# Patient Record
Sex: MALE | Race: WHITE | NOT HISPANIC OR LATINO
[De-identification: names, ages, dates, MRNs, and addresses within clinical notes are randomized per-mention and may not be internally consistent; named-entity substitution may affect disease eponyms.]

---

## 2017-06-15 ENCOUNTER — RECORD ABSTRACTING (OUTPATIENT)
Age: 62
End: 2017-06-15

## 2017-06-15 DIAGNOSIS — R07.89 OTHER CHEST PAIN: ICD-10-CM

## 2017-06-15 DIAGNOSIS — R68.84 JAW PAIN: ICD-10-CM

## 2017-06-15 DIAGNOSIS — K22.9 DISEASE OF ESOPHAGUS, UNSPECIFIED: ICD-10-CM

## 2017-06-23 ENCOUNTER — LABORATORY RESULT (OUTPATIENT)
Age: 62
End: 2017-06-23

## 2017-06-23 ENCOUNTER — NON-APPOINTMENT (OUTPATIENT)
Age: 62
End: 2017-06-23

## 2017-06-23 ENCOUNTER — APPOINTMENT (OUTPATIENT)
Dept: INTERNAL MEDICINE | Facility: CLINIC | Age: 62
End: 2017-06-23

## 2017-06-23 VITALS
HEIGHT: 69 IN | WEIGHT: 178 LBS | DIASTOLIC BLOOD PRESSURE: 70 MMHG | BODY MASS INDEX: 26.36 KG/M2 | SYSTOLIC BLOOD PRESSURE: 102 MMHG

## 2017-06-23 VITALS — DIASTOLIC BLOOD PRESSURE: 70 MMHG | SYSTOLIC BLOOD PRESSURE: 102 MMHG

## 2017-06-23 LAB
BILIRUB UR QL STRIP: NORMAL
GLUCOSE UR-MCNC: NORMAL
HCG UR QL: 1 EU/DL
HGB UR QL STRIP.AUTO: NORMAL
KETONES UR-MCNC: NORMAL
LEUKOCYTE ESTERASE UR QL STRIP: NORMAL
NITRITE UR QL STRIP: NORMAL
PH UR STRIP: 6.5
PROT UR STRIP-MCNC: NORMAL
SP GR UR STRIP: 1.02

## 2017-06-24 LAB
25(OH)D3 SERPL-MCNC: 26.3 NG/ML
ALBUMIN SERPL ELPH-MCNC: 4 G/DL
ALP BLD-CCNC: 57 U/L
ALT SERPL-CCNC: 15 U/L
ANION GAP SERPL CALC-SCNC: 13 MMOL/L
AST SERPL-CCNC: 26 U/L
BASOPHILS # BLD AUTO: 0.02 K/UL
BASOPHILS NFR BLD AUTO: 0.2 %
BILIRUB SERPL-MCNC: 0.6 MG/DL
BUN SERPL-MCNC: 19 MG/DL
CALCIUM SERPL-MCNC: 9.3 MG/DL
CHLORIDE SERPL-SCNC: 106 MMOL/L
CHOLEST SERPL-MCNC: 167 MG/DL
CHOLEST/HDLC SERPL: 2.7 RATIO
CO2 SERPL-SCNC: 25 MMOL/L
CREAT SERPL-MCNC: 1.12 MG/DL
EOSINOPHIL # BLD AUTO: 0.22 K/UL
EOSINOPHIL NFR BLD AUTO: 2.6 %
GLUCOSE SERPL-MCNC: 81 MG/DL
HBA1C MFR BLD HPLC: 5.8 %
HCT VFR BLD CALC: 40.1 %
HDLC SERPL-MCNC: 63 MG/DL
HGB BLD-MCNC: 13.6 G/DL
IMM GRANULOCYTES NFR BLD AUTO: 0.2 %
LDLC SERPL CALC-MCNC: 76 MG/DL
LYMPHOCYTES # BLD AUTO: 1.43 K/UL
LYMPHOCYTES NFR BLD AUTO: 16.7 %
MAN DIFF?: NORMAL
MCHC RBC-ENTMCNC: 30.3 PG
MCHC RBC-ENTMCNC: 33.9 GM/DL
MCV RBC AUTO: 89.3 FL
MONOCYTES # BLD AUTO: 0.8 K/UL
MONOCYTES NFR BLD AUTO: 9.4 %
NEUTROPHILS # BLD AUTO: 6.06 K/UL
NEUTROPHILS NFR BLD AUTO: 70.9 %
PLATELET # BLD AUTO: 222 K/UL
POTASSIUM SERPL-SCNC: 4.6 MMOL/L
PROT SERPL-MCNC: 6.8 G/DL
PSA SERPL-MCNC: 1.3 NG/ML
PSA SERPL-MCNC: 1.38 NG/ML
RBC # BLD: 4.49 M/UL
RBC # FLD: 13.1 %
SAVE SPECIMEN: NORMAL
SODIUM SERPL-SCNC: 144 MMOL/L
T3RU NFR SERPL: 1.03 INDEX
T4 SERPL-MCNC: 6.4 UG/DL
TRIGL SERPL-MCNC: 139 MG/DL
TSH SERPL-ACNC: 1.91 UIU/ML
URATE SERPL-MCNC: 6 MG/DL
WBC # FLD AUTO: 8.55 K/UL

## 2017-10-06 ENCOUNTER — APPOINTMENT (OUTPATIENT)
Dept: OPHTHALMOLOGY | Facility: CLINIC | Age: 62
End: 2017-10-06
Payer: COMMERCIAL

## 2017-10-06 DIAGNOSIS — H25.13 AGE-RELATED NUCLEAR CATARACT, BILATERAL: ICD-10-CM

## 2017-10-06 DIAGNOSIS — H40.003 PREGLAUCOMA, UNSPECIFIED, BILATERAL: ICD-10-CM

## 2017-10-06 PROCEDURE — 92133 CPTRZD OPH DX IMG PST SGM ON: CPT

## 2017-10-06 PROCEDURE — 92015 DETERMINE REFRACTIVE STATE: CPT

## 2017-10-06 PROCEDURE — 76514 ECHO EXAM OF EYE THICKNESS: CPT

## 2017-10-06 PROCEDURE — 99244 OFF/OP CNSLTJ NEW/EST MOD 40: CPT

## 2018-01-12 ENCOUNTER — APPOINTMENT (OUTPATIENT)
Dept: OPHTHALMOLOGY | Facility: CLINIC | Age: 63
End: 2018-01-12

## 2018-11-12 ENCOUNTER — APPOINTMENT (OUTPATIENT)
Dept: INTERNAL MEDICINE | Facility: CLINIC | Age: 63
End: 2018-11-12

## 2018-11-21 ENCOUNTER — LABORATORY RESULT (OUTPATIENT)
Age: 63
End: 2018-11-21

## 2018-11-21 ENCOUNTER — APPOINTMENT (OUTPATIENT)
Dept: INTERNAL MEDICINE | Facility: CLINIC | Age: 63
End: 2018-11-21
Payer: COMMERCIAL

## 2018-11-21 ENCOUNTER — NON-APPOINTMENT (OUTPATIENT)
Age: 63
End: 2018-11-21

## 2018-11-21 VITALS — SYSTOLIC BLOOD PRESSURE: 98 MMHG | DIASTOLIC BLOOD PRESSURE: 70 MMHG

## 2018-11-21 VITALS
WEIGHT: 170 LBS | SYSTOLIC BLOOD PRESSURE: 110 MMHG | HEIGHT: 68.5 IN | BODY MASS INDEX: 25.47 KG/M2 | DIASTOLIC BLOOD PRESSURE: 72 MMHG

## 2018-11-21 DIAGNOSIS — K44.9 DIAPHRAGMATIC HERNIA W/OUT OBSTRUCTION OR GANGRENE: ICD-10-CM

## 2018-11-21 LAB
BILIRUB UR QL STRIP: NORMAL
CLARITY UR: CLEAR
COLLECTION METHOD: NORMAL
GLUCOSE UR-MCNC: NORMAL
HCG UR QL: 0.2 EU/DL
HGB UR QL STRIP.AUTO: NORMAL
KETONES UR-MCNC: NORMAL
LEUKOCYTE ESTERASE UR QL STRIP: NORMAL
NITRITE UR QL STRIP: NORMAL
PH UR STRIP: 5
PROT UR STRIP-MCNC: NORMAL
SP GR UR STRIP: 1.02

## 2018-11-21 PROCEDURE — G0008: CPT

## 2018-11-21 PROCEDURE — 81003 URINALYSIS AUTO W/O SCOPE: CPT | Mod: QW

## 2018-11-21 PROCEDURE — 93000 ELECTROCARDIOGRAM COMPLETE: CPT

## 2018-11-21 PROCEDURE — 36415 COLL VENOUS BLD VENIPUNCTURE: CPT

## 2018-11-21 PROCEDURE — 99397 PER PM REEVAL EST PAT 65+ YR: CPT | Mod: 25

## 2018-11-21 PROCEDURE — 90686 IIV4 VACC NO PRSV 0.5 ML IM: CPT

## 2018-11-21 NOTE — HISTORY OF PRESENT ILLNESS
[FreeTextEntry1] : This is a 63-year-old male for annual health assessment [de-identified] : Specifically we will address his history of hypercholesterolemia sinus bradycardia reflux kind atypical chest pain\par \par Patient has few complaints nocturia x1 and a decreased stream.\par \par His major problem is stress both at home and work and a possible impending divorce. He states he is irritable and has difficulty sleeping

## 2018-11-21 NOTE — ASSESSMENT
[FreeTextEntry1] : This is a 63-year-old male whose history has been reviewed above\par \par He has a history of hypercholesterolemia remains on a statin for cholesterol profile a prescription pain medication changes per dictated results.\par \par He is having some decreased stream and nocturia x1. I don't feel that he needs any intervention at this time\par \par He is quite anxious appropriately. I do not think he needs any chronic psychotropic medications however if he disorders I will prescribe her anxietolitic and hypnotic medications for p.r.n. use\par \par He did have a stricture on endoscopy which looked benign however I suggested a followup with sugar in which he did not do he states she will followup this year\par \par She will also followup in reference to his tubular adenoma with GI

## 2018-11-21 NOTE — HEALTH RISK ASSESSMENT
[Very Good] : ~his/her~  mood as very good [None] : None [With Family] : lives with family [Employed] : employed [College] : College [] :  [Feels Safe at Home] : Feels safe at home [Fully functional (bathing, dressing, toileting, transferring, walking, feeding)] : Fully functional (bathing, dressing, toileting, transferring, walking, feeding) [Fully functional (using the telephone, shopping, preparing meals, housekeeping, doing laundry, using] : Fully functional and needs no help or supervision to perform IADLs (using the telephone, shopping, preparing meals, housekeeping, doing laundry, using transportation, managing medications and managing finances) [Smoke Detector] : smoke detector [Carbon Monoxide Detector] : carbon monoxide detector [Seat Belt] :  uses seat belt [Sunscreen] : uses sunscreen [Discussed at today's visit] : Advance Directives Discussed at today's visit [] : No [Change in mental status noted] : No change in mental status noted [Sexually Active] : not sexually active [Reports changes in hearing] : Reports no changes in hearing [Reports changes in vision] : Reports no changes in vision [Reports changes in dental health] : Reports no changes in dental health [Guns at Home] : no guns at home [Safety elements used in home] : no safety elements used in home [Travel to Developing Areas] : does not  travel to developing areas [TB Exposure] : is not being exposed to tuberculosis [Caregiver Concerns] : does not have caregiver concerns [HepatitisCComments] : negative

## 2018-11-22 LAB
25(OH)D3 SERPL-MCNC: 32.3 NG/ML
ALBUMIN SERPL ELPH-MCNC: 4.3 G/DL
ALP BLD-CCNC: 53 U/L
ALT SERPL-CCNC: 14 U/L
ANION GAP SERPL CALC-SCNC: 13 MMOL/L
AST SERPL-CCNC: 24 U/L
BASOPHILS # BLD AUTO: 0.02 K/UL
BASOPHILS NFR BLD AUTO: 0.3 %
BILIRUB SERPL-MCNC: 0.6 MG/DL
BUN SERPL-MCNC: 27 MG/DL
CALCIUM SERPL-MCNC: 9.9 MG/DL
CHLORIDE SERPL-SCNC: 106 MMOL/L
CHOLEST SERPL-MCNC: 202 MG/DL
CHOLEST/HDLC SERPL: 2.5 RATIO
CO2 SERPL-SCNC: 26 MMOL/L
CREAT SERPL-MCNC: 1.3 MG/DL
EOSINOPHIL # BLD AUTO: 0.3 K/UL
EOSINOPHIL NFR BLD AUTO: 4 %
GLUCOSE SERPL-MCNC: 88 MG/DL
HBA1C MFR BLD HPLC: 5.9 %
HCT VFR BLD CALC: 41.8 %
HDLC SERPL-MCNC: 80 MG/DL
HGB BLD-MCNC: 13.9 G/DL
IMM GRANULOCYTES NFR BLD AUTO: 0.1 %
LDLC SERPL CALC-MCNC: 105 MG/DL
LYMPHOCYTES # BLD AUTO: 1.36 K/UL
LYMPHOCYTES NFR BLD AUTO: 18.2 %
MAN DIFF?: NORMAL
MCHC RBC-ENTMCNC: 29.9 PG
MCHC RBC-ENTMCNC: 33.3 GM/DL
MCV RBC AUTO: 89.9 FL
MONOCYTES # BLD AUTO: 0.49 K/UL
MONOCYTES NFR BLD AUTO: 6.6 %
NEUTROPHILS # BLD AUTO: 5.29 K/UL
NEUTROPHILS NFR BLD AUTO: 70.8 %
PLATELET # BLD AUTO: 184 K/UL
POTASSIUM SERPL-SCNC: 4.6 MMOL/L
PROT SERPL-MCNC: 6.6 G/DL
PSA SERPL-MCNC: 1.16 NG/ML
RBC # BLD: 4.65 M/UL
RBC # FLD: 13.6 %
SAVE SPECIMEN: NORMAL
SODIUM SERPL-SCNC: 145 MMOL/L
T3RU NFR SERPL: 1.03 INDEX
T4 SERPL-MCNC: 5.8 UG/DL
TRIGL SERPL-MCNC: 85 MG/DL
TSH SERPL-ACNC: 2.62 UIU/ML
URATE SERPL-MCNC: 5.3 MG/DL
WBC # FLD AUTO: 7.47 K/UL

## 2018-12-07 ENCOUNTER — APPOINTMENT (OUTPATIENT)
Dept: INTERNAL MEDICINE | Facility: CLINIC | Age: 63
End: 2018-12-07

## 2019-02-05 ENCOUNTER — APPOINTMENT (OUTPATIENT)
Dept: GASTROENTEROLOGY | Facility: CLINIC | Age: 64
End: 2019-02-05
Payer: MEDICARE

## 2019-02-05 VITALS
HEIGHT: 68.5 IN | SYSTOLIC BLOOD PRESSURE: 115 MMHG | HEART RATE: 47 BPM | DIASTOLIC BLOOD PRESSURE: 64 MMHG | BODY MASS INDEX: 25.77 KG/M2 | WEIGHT: 172 LBS

## 2019-02-05 DIAGNOSIS — Z82.49 FAMILY HISTORY OF ISCHEMIC HEART DISEASE AND OTHER DISEASES OF THE CIRCULATORY SYSTEM: ICD-10-CM

## 2019-02-05 DIAGNOSIS — R19.5 OTHER FECAL ABNORMALITIES: ICD-10-CM

## 2019-02-05 DIAGNOSIS — K63.5 POLYP OF COLON: ICD-10-CM

## 2019-02-05 DIAGNOSIS — K62.5 HEMORRHAGE OF ANUS AND RECTUM: ICD-10-CM

## 2019-02-05 PROCEDURE — 99204 OFFICE O/P NEW MOD 45 MIN: CPT

## 2019-02-05 PROCEDURE — 82274 ASSAY TEST FOR BLOOD FECAL: CPT | Mod: QW

## 2019-02-07 NOTE — ASSESSMENT
[FreeTextEntry1] : 1. History of colonic tubular adenoma, with hyperplastic polyp and hemorrhoids at last recorded colonoscopy January 2012--rule out metachronous colorectal neoplasm. Episodic rectal bleeding and today's guaiac positive stool likely hemorrhoidal in origin.\par 2. History of esophageal stricture, GERD, with no significant upper GI symptoms at this point.\par 3. Overweight.\par 4. Hypercholesterolemia.\par 5. Prediabetes.\par 6. Mild BPH.\par 7. Sinus bradycardia.\par 8. Ex-smoker.\par \par Plan:\par 1. Medical record release for Dr. Keith Liang.\par 2. Recent labs reviewed.\par 3. Agree with periodic surveillance colonoscopy-- Procedure, rationale, alternatives, material risks, anesthesia plan, MiraLax prep instructions were reviewed and brochure given.\par 4. Other recommendations to follow.

## 2019-02-07 NOTE — PHYSICAL EXAM
[General Appearance - Alert] : alert [General Appearance - In No Acute Distress] : in no acute distress [General Appearance - Well Nourished] : well nourished [General Appearance - Well Developed] : well developed [Sclera] : the sclera and conjunctiva were normal [Outer Ear] : the ears and nose were normal in appearance [Oropharynx] : the oropharynx was normal [Neck Appearance] : the appearance of the neck was normal [Neck Cervical Mass (___cm)] : no neck mass was observed [Jugular Venous Distention Increased] : there was no jugular-venous distention [Thyroid Diffuse Enlargement] : the thyroid was not enlarged [Thyroid Nodule] : there were no palpable thyroid nodules [Auscultation Breath Sounds / Voice Sounds] : lungs were clear to auscultation bilaterally [Heart Sounds] : normal S1 and S2 [Heart Sounds Gallop] : no gallops [Murmurs] : no murmurs [Heart Sounds Pericardial Friction Rub] : no pericardial rub [Full Pulse] : the pedal pulses are present [Edema] : there was no peripheral edema [Bowel Sounds] : normal bowel sounds [Abdomen Soft] : soft [Abdomen Tenderness] : non-tender [Abdomen Mass (___ Cm)] : no abdominal mass palpated [Normal Sphincter Tone] : normal sphincter tone [No Rectal Mass] : no rectal mass [Internal Hemorrhoid] : internal hemorrhoids [External Hemorrhoid] : external hemorrhoids [Occult Blood Positive] : stool positive for occult blood [Prostate Size___ (Scale 0-4)] : prostate size was [unfilled] on a scale of 0-4 [Cervical Lymph Nodes Enlarged Posterior Bilaterally] : posterior cervical [Cervical Lymph Nodes Enlarged Anterior Bilaterally] : anterior cervical [Supraclavicular Lymph Nodes Enlarged Bilaterally] : supraclavicular [Axillary Lymph Nodes Enlarged Bilaterally] : axillary [Femoral Lymph Nodes Enlarged Bilaterally] : femoral [Inguinal Lymph Nodes Enlarged Bilaterally] : inguinal [No CVA Tenderness] : no ~M costovertebral angle tenderness [No Spinal Tenderness] : no spinal tenderness [Abnormal Walk] : normal gait [Nail Clubbing] : no clubbing  or cyanosis of the fingernails [Musculoskeletal - Swelling] : no joint swelling seen [Motor Tone] : muscle strength and tone were normal [Skin Color & Pigmentation] : normal skin color and pigmentation [Skin Turgor] : normal skin turgor [] : no rash [Oriented To Time, Place, And Person] : oriented to person, place, and time [Impaired Insight] : insight and judgment were intact [Affect] : the affect was normal [Prostate Tenderness] : was not tender [FreeTextEntry1] : some cherry angiomata

## 2019-02-07 NOTE — HISTORY OF PRESENT ILLNESS
[FreeTextEntry1] : Geremias has undergone several colonoscopies (Dr. Keith Liang), with tubular adenoma noted at first procedure; according to records forwarded to me yesterday (that were difficult to read), a hyperplastic polyp was removed at last colonoscopy 1/8/12, although patient seems to recall another procedure in 2015. He has history of GERD and atypical chest pain, was advised of esophageal stricture at EGD, but never underwent esophageal dilatation, and he currently denies upper GI symptoms, not taking PPI at this time. He has noted some bright red blood upon wiping from time to time, attributed to hemorrhoids. Family history is negative for GI neoplasia.

## 2019-02-07 NOTE — CONSULT LETTER
[Dear  ___] : Dear  [unfilled], [Consult Letter:] : I had the pleasure of evaluating your patient, [unfilled]. [Please see my note below.] : Please see my note below. [Consult Closing:] : Thank you very much for allowing me to participate in the care of this patient.  If you have any questions, please do not hesitate to contact me. [Sincerely,] : Sincerely, [FreeTextEntry3] : Hugo Kapoor M.D.\par

## 2019-12-08 ENCOUNTER — FORM ENCOUNTER (OUTPATIENT)
Age: 64
End: 2019-12-08

## 2019-12-09 ENCOUNTER — APPOINTMENT (OUTPATIENT)
Dept: RADIOLOGY | Facility: CLINIC | Age: 64
End: 2019-12-09

## 2019-12-09 ENCOUNTER — APPOINTMENT (OUTPATIENT)
Dept: RADIOLOGY | Facility: IMAGING CENTER | Age: 64
End: 2019-12-09
Payer: MEDICARE

## 2019-12-09 ENCOUNTER — OUTPATIENT (OUTPATIENT)
Dept: OUTPATIENT SERVICES | Facility: HOSPITAL | Age: 64
LOS: 1 days | End: 2019-12-09
Payer: MEDICARE

## 2019-12-09 ENCOUNTER — APPOINTMENT (OUTPATIENT)
Dept: INTERNAL MEDICINE | Facility: CLINIC | Age: 64
End: 2019-12-09
Payer: MEDICARE

## 2019-12-09 VITALS
BODY MASS INDEX: 24.57 KG/M2 | DIASTOLIC BLOOD PRESSURE: 80 MMHG | HEIGHT: 68.5 IN | TEMPERATURE: 99.4 F | WEIGHT: 164 LBS | SYSTOLIC BLOOD PRESSURE: 110 MMHG

## 2019-12-09 DIAGNOSIS — J06.9 ACUTE UPPER RESPIRATORY INFECTION, UNSPECIFIED: ICD-10-CM

## 2019-12-09 LAB
FLUAV SPEC QL CULT: NORMAL
FLUBV AG SPEC QL IA: NORMAL
S PYO AG SPEC QL IA: NORMAL

## 2019-12-09 PROCEDURE — 87880 STREP A ASSAY W/OPTIC: CPT | Mod: QW

## 2019-12-09 PROCEDURE — 71046 X-RAY EXAM CHEST 2 VIEWS: CPT

## 2019-12-09 PROCEDURE — 71046 X-RAY EXAM CHEST 2 VIEWS: CPT | Mod: 26

## 2019-12-09 PROCEDURE — 87804 INFLUENZA ASSAY W/OPTIC: CPT | Mod: QW

## 2019-12-09 PROCEDURE — 99214 OFFICE O/P EST MOD 30 MIN: CPT | Mod: 25

## 2019-12-09 NOTE — PHYSICAL EXAM
[No JVD] : no jugular venous distention [Normal Sclera/Conjunctiva] : normal sclera/conjunctiva [No Lymphadenopathy] : no lymphadenopathy [Normal] : no rash [de-identified] : diffuse rhonchi right

## 2019-12-09 NOTE — HISTORY OF PRESENT ILLNESS
[FreeTextEntry8] : This is a 64-year-old male who presents with malaise weakness cough and fever\par \par He states he feels extremely lethargic and weak

## 2019-12-09 NOTE — ASSESSMENT
[FreeTextEntry1] : This is a 64-year-old male who presents with symptoms of an upper respiratory tract infection he is negative for flu and for strep. His blood pressure is on the low side he does not have orthostasis\par \par He has a low-grade fever here and he has Rales at the right base.\par \par My impression is that he has a community-acquired pneumonia. He is alert and appropriate he has no shaking chills and is making urine\par \par He apparently started himself on a Z-Daniel. We will obtain a chest x-ray\par \par Depending on the x-ray I may have to start him on a quinolone although I am reluctant\par \par I discussed with both the patient and his wife that if he has any change in mental status fever with chills or profound weakness to go to the emergency room. I did offer him this option now but he declined

## 2019-12-17 ENCOUNTER — APPOINTMENT (OUTPATIENT)
Dept: INTERNAL MEDICINE | Facility: CLINIC | Age: 64
End: 2019-12-17

## 2019-12-18 ENCOUNTER — APPOINTMENT (OUTPATIENT)
Dept: INTERNAL MEDICINE | Facility: CLINIC | Age: 64
End: 2019-12-18
Payer: MEDICARE

## 2019-12-18 VITALS
BODY MASS INDEX: 24.57 KG/M2 | WEIGHT: 164 LBS | SYSTOLIC BLOOD PRESSURE: 118 MMHG | DIASTOLIC BLOOD PRESSURE: 70 MMHG | HEIGHT: 68.5 IN

## 2019-12-18 DIAGNOSIS — J18.9 PNEUMONIA, UNSPECIFIED ORGANISM: ICD-10-CM

## 2019-12-18 PROCEDURE — 99213 OFFICE O/P EST LOW 20 MIN: CPT

## 2019-12-18 NOTE — PHYSICAL EXAM
[Normal] : normal rate, regular rhythm, normal S1 and S2 and no murmur heard [No Joint Swelling] : no joint swelling [No Rash] : no rash [de-identified] : still occasional rhonchi at left base

## 2019-12-18 NOTE — ASSESSMENT
[FreeTextEntry1] : This is a 64-year-old male who is status post episode of community-acquired pneumonia\par \par He is back to baseline with the exception of a mild cough and some rhonchi in his right base.\par \par At this point he can use mild cough suppressant no other restrictions we will repeat a chest x-ray 4 weeks from the onset

## 2019-12-31 ENCOUNTER — APPOINTMENT (OUTPATIENT)
Dept: GASTROENTEROLOGY | Facility: CLINIC | Age: 64
End: 2019-12-31
Payer: MEDICARE

## 2019-12-31 VITALS
TEMPERATURE: 98.6 F | DIASTOLIC BLOOD PRESSURE: 70 MMHG | HEIGHT: 68 IN | HEART RATE: 68 BPM | BODY MASS INDEX: 24.55 KG/M2 | OXYGEN SATURATION: 99 % | SYSTOLIC BLOOD PRESSURE: 110 MMHG | WEIGHT: 162 LBS

## 2019-12-31 DIAGNOSIS — Z12.11 ENCOUNTER FOR SCREENING FOR MALIGNANT NEOPLASM OF COLON: ICD-10-CM

## 2019-12-31 DIAGNOSIS — K22.2 ESOPHAGEAL OBSTRUCTION: ICD-10-CM

## 2019-12-31 DIAGNOSIS — Z78.9 OTHER SPECIFIED HEALTH STATUS: ICD-10-CM

## 2019-12-31 PROCEDURE — 99204 OFFICE O/P NEW MOD 45 MIN: CPT

## 2019-12-31 PROCEDURE — 99213 OFFICE O/P EST LOW 20 MIN: CPT

## 2019-12-31 RX ORDER — AZITHROMYCIN 250 MG/1
250 TABLET, FILM COATED ORAL
Qty: 1 | Refills: 0 | Status: COMPLETED | COMMUNITY
Start: 2019-12-09 | End: 2019-12-31

## 2019-12-31 RX ORDER — AMOXICILLIN AND CLAVULANATE POTASSIUM 500; 125 MG/1; MG/1
500-125 TABLET, FILM COATED ORAL TWICE DAILY
Qty: 20 | Refills: 0 | Status: COMPLETED | COMMUNITY
Start: 2019-12-09 | End: 2019-12-31

## 2019-12-31 NOTE — PHYSICAL EXAM
[General Appearance - Alert] : alert [General Appearance - In No Acute Distress] : in no acute distress [Sclera] : the sclera and conjunctiva were normal [PERRL With Normal Accommodation] : pupils were equal in size, round, and reactive to light [Extraocular Movements] : extraocular movements were intact [Outer Ear] : the ears and nose were normal in appearance [Oropharynx] : the oropharynx was normal [Neck Appearance] : the appearance of the neck was normal [Jugular Venous Distention Increased] : there was no jugular-venous distention [Neck Cervical Mass (___cm)] : no neck mass was observed [Thyroid Diffuse Enlargement] : the thyroid was not enlarged [Thyroid Nodule] : there were no palpable thyroid nodules [Auscultation Breath Sounds / Voice Sounds] : lungs were clear to auscultation bilaterally [Heart Rate And Rhythm] : heart rate was normal and rhythm regular [Heart Sounds] : normal S1 and S2 [Heart Sounds Gallop] : no gallops [Murmurs] : no murmurs [Heart Sounds Pericardial Friction Rub] : no pericardial rub [Bowel Sounds] : normal bowel sounds [Abdomen Tenderness] : non-tender [Abdomen Soft] : soft [Cervical Lymph Nodes Enlarged Posterior Bilaterally] : posterior cervical [Abdomen Mass (___ Cm)] : no abdominal mass palpated [] : no hepato-splenomegaly [Cervical Lymph Nodes Enlarged Anterior Bilaterally] : anterior cervical [Supraclavicular Lymph Nodes Enlarged Bilaterally] : supraclavicular [No CVA Tenderness] : no ~M costovertebral angle tenderness [No Spinal Tenderness] : no spinal tenderness [Nail Clubbing] : no clubbing  or cyanosis of the fingernails [Musculoskeletal - Swelling] : no joint swelling seen [Abnormal Walk] : normal gait [Deep Tendon Reflexes (DTR)] : deep tendon reflexes were 2+ and symmetric [Sensation] : the sensory exam was normal to light touch and pinprick [No Focal Deficits] : no focal deficits [Motor Tone] : muscle strength and tone were normal [Oriented To Time, Place, And Person] : oriented to person, place, and time [Impaired Insight] : insight and judgment were intact [Affect] : the affect was normal

## 2019-12-31 NOTE — HISTORY OF PRESENT ILLNESS
[Heartburn] : denies heartburn [Nausea] : denies nausea [Vomiting] : denies vomiting [Diarrhea] : denies diarrhea [Constipation] : denies constipation [Yellow Skin Or Eyes (Jaundice)] : denies jaundice [Abdominal Pain] : denies abdominal pain [Abdominal Swelling] : denies abdominal swelling [Rectal Pain] : denies rectal pain [Wt Loss ___ Lbs] : recent [unfilled] ~Upound(s) weight loss [Wt Gain ___ Lbs] : no recent weight gain [GERD] : no gastroesophageal reflux disease [Hiatus Hernia] : no hiatus hernia [Peptic Ulcer Disease] : no peptic ulcer disease [Cholelithiasis] : no cholelithiasis [Pancreatitis] : no pancreatitis [Inflammatory Bowel Disease] : no inflammatory bowel disease [Kidney Stone] : no kidney stone [Irritable Bowel Syndrome] : no irritable bowel syndrome [Alcohol Abuse] : no alcohol abuse [Diverticulitis] : no diverticulitis [Malignancy] : no malignancy [Abdominal Surgery] : no abdominal surgery [Appendectomy] : no appendectomy [de-identified] : -64 year old man referred fro a screening colonoscopy. his last colonoscopy was over 8 years ago. He has ha history of colon polyps removed in the past. he denies rectal bleeding, melena or hematemesis. he was told of an esophagea stricture on previous EGD but he had no intervention and he is asymptomatic. He denies dysphagia to solids or liquids. He has a RBBB since childhood. [Cholecystectomy] : no cholecystectomy

## 2019-12-31 NOTE — CONSULT LETTER
[Dear  ___] : Dear  [unfilled], [Consult Letter:] : I had the pleasure of evaluating your patient, [unfilled]. [Consult Closing:] : Thank you very much for allowing me to participate in the care of this patient.  If you have any questions, please do not hesitate to contact me. [Please see my note below.] : Please see my note below. [FreeTextEntry3] : Jonathan Lafleur MD, FACG\par  [Sincerely,] : Sincerely,

## 2019-12-31 NOTE — REVIEW OF SYSTEMS
[Abdominal Pain] : no abdominal pain [As Noted in HPI] : as noted in HPI [Vomiting] : no vomiting [Constipation] : no constipation [Diarrhea] : no diarrhea [Heartburn] : no heartburn [Melena] : no melena [Negative] : Heme/Lymph

## 2020-02-05 ENCOUNTER — APPOINTMENT (OUTPATIENT)
Dept: GASTROENTEROLOGY | Facility: AMBULATORY MEDICAL SERVICES | Age: 65
End: 2020-02-05
Payer: MEDICARE

## 2020-02-05 PROCEDURE — G0105: CPT

## 2020-02-13 DIAGNOSIS — Z86.59 PERSONAL HISTORY OF OTHER MENTAL AND BEHAVIORAL DISORDERS: ICD-10-CM

## 2020-02-14 ENCOUNTER — APPOINTMENT (OUTPATIENT)
Dept: INTERNAL MEDICINE | Facility: CLINIC | Age: 65
End: 2020-02-14

## 2020-07-28 ENCOUNTER — APPOINTMENT (OUTPATIENT)
Dept: INTERNAL MEDICINE | Facility: CLINIC | Age: 65
End: 2020-07-28
Payer: MEDICARE

## 2020-07-28 ENCOUNTER — NON-APPOINTMENT (OUTPATIENT)
Age: 65
End: 2020-07-28

## 2020-07-28 ENCOUNTER — LABORATORY RESULT (OUTPATIENT)
Age: 65
End: 2020-07-28

## 2020-07-28 VITALS
SYSTOLIC BLOOD PRESSURE: 120 MMHG | WEIGHT: 168 LBS | HEIGHT: 68.5 IN | TEMPERATURE: 98 F | BODY MASS INDEX: 25.17 KG/M2 | DIASTOLIC BLOOD PRESSURE: 80 MMHG

## 2020-07-28 DIAGNOSIS — K21.9 GASTRO-ESOPHAGEAL REFLUX DISEASE W/OUT ESOPHAGITIS: ICD-10-CM

## 2020-07-28 DIAGNOSIS — Z23 ENCOUNTER FOR IMMUNIZATION: ICD-10-CM

## 2020-07-28 DIAGNOSIS — Z86.010 PERSONAL HISTORY OF COLONIC POLYPS: ICD-10-CM

## 2020-07-28 PROCEDURE — G0439: CPT

## 2020-07-28 PROCEDURE — G0009: CPT

## 2020-07-28 PROCEDURE — 90732 PPSV23 VACC 2 YRS+ SUBQ/IM: CPT

## 2020-07-28 PROCEDURE — 99213 OFFICE O/P EST LOW 20 MIN: CPT | Mod: 25

## 2020-07-28 PROCEDURE — 36415 COLL VENOUS BLD VENIPUNCTURE: CPT

## 2020-07-28 PROCEDURE — 93000 ELECTROCARDIOGRAM COMPLETE: CPT | Mod: 59

## 2020-07-28 RX ORDER — SODIUM SULFATE, POTASSIUM SULFATE, MAGNESIUM SULFATE 17.5; 3.13; 1.6 G/ML; G/ML; G/ML
17.5-3.13-1.6 SOLUTION, CONCENTRATE ORAL
Qty: 1 | Refills: 0 | Status: DISCONTINUED | COMMUNITY
Start: 2019-12-31 | End: 2020-07-28

## 2020-07-28 NOTE — HISTORY OF PRESENT ILLNESS
[FreeTextEntry1] : This is a 65-year-old male for annual health assessment\par \par Specifically we will address his history of hypercholesterolemia weight colonic polyps BPH [de-identified] : Overall he is feeling well. He is under some stress from family issues

## 2020-07-28 NOTE — HEALTH RISK ASSESSMENT
[Good] : ~his/her~  mood as  good [Yes] : Yes [No falls in past year] : Patient reported no falls in the past year [0] : 1) Little interest or pleasure doing things: Not at all (0) [None] : None [With Family] : lives with family [Employed] : employed [College] : College [Feels Safe at Home] : Feels safe at home [Smoke Detector] : smoke detector [Carbon Monoxide Detector] : carbon monoxide detector [Seat Belt] :  uses seat belt [Sunscreen] : uses sunscreen [] : No [Change in mental status noted] : No change in mental status noted [Sexually Active] : not sexually active [Reports changes in hearing] : Reports no changes in hearing [Reports changes in vision] : Reports no changes in vision [Reports changes in dental health] : Reports no changes in dental health [Guns at Home] : no guns at home [Safety elements used in home] : no safety elements used in home [TB Exposure] : is not being exposed to tuberculosis [Caregiver Concerns] : does not have caregiver concerns [FreeTextEntry3] : living together but

## 2020-07-28 NOTE — PHYSICAL EXAM
[No Acute Distress] : no acute distress [Well Developed] : well developed [Well Nourished] : well nourished [Normal Sclera/Conjunctiva] : normal sclera/conjunctiva [Well-Appearing] : well-appearing [PERRL] : pupils equal round and reactive to light [Normal Outer Ear/Nose] : the outer ears and nose were normal in appearance [EOMI] : extraocular movements intact [Normal Oropharynx] : the oropharynx was normal [Supple] : supple [No Lymphadenopathy] : no lymphadenopathy [No JVD] : no jugular venous distention [Thyroid Normal, No Nodules] : the thyroid was normal and there were no nodules present [No Respiratory Distress] : no respiratory distress  [No Accessory Muscle Use] : no accessory muscle use [Normal Rate] : normal rate  [Clear to Auscultation] : lungs were clear to auscultation bilaterally [Regular Rhythm] : with a regular rhythm [Normal S1, S2] : normal S1 and S2 [No Murmur] : no murmur heard [No Abdominal Bruit] : a ~M bruit was not heard ~T in the abdomen [No Carotid Bruits] : no carotid bruits [Pedal Pulses Present] : the pedal pulses are present [No Varicosities] : no varicosities [No Edema] : there was no peripheral edema [No Palpable Aorta] : no palpable aorta [No Extremity Clubbing/Cyanosis] : no extremity clubbing/cyanosis [Soft] : abdomen soft [Non Tender] : non-tender [Non-distended] : non-distended [No Masses] : no abdominal mass palpated [Normal Bowel Sounds] : normal bowel sounds [No HSM] : no HSM [Normal Posterior Cervical Nodes] : no posterior cervical lymphadenopathy [Normal Anterior Cervical Nodes] : no anterior cervical lymphadenopathy [No CVA Tenderness] : no CVA  tenderness [No Spinal Tenderness] : no spinal tenderness [Grossly Normal Strength/Tone] : grossly normal strength/tone [No Joint Swelling] : no joint swelling [No Rash] : no rash [No Focal Deficits] : no focal deficits [Coordination Grossly Intact] : coordination grossly intact [Normal Affect] : the affect was normal [Deep Tendon Reflexes (DTR)] : deep tendon reflexes were 2+ and symmetric [Normal Gait] : normal gait [Normal Insight/Judgement] : insight and judgment were intact

## 2020-07-28 NOTE — ASSESSMENT
[FreeTextEntry1] : This is a 65-year-old male whose history has been reviewed above\par \par He has a history of a stricture of the esophagus he has not followed up. He has very rare episodes of reflux and is not taking any medications on a chronic basis for this\par \par He did have a history of atypical chest pain this has dissipated over the years no intervention\par \par He is under some stress but seems to be handling it better no intervention\par \par He does have a history of colonic polyps he did have a recent colonoscopy which did not demonstrate colonic polyps with diverticulosis.\par \par He did have an elevated hemoglobin A1c he has lost some weight this was repeated recommendations pending results\par \par I did repeat his EKG he has a right bundle branch block with APCs\par \par A. Pneumovax was given\par \par He does have mild symptoms of BPH but these have been stable no intervention\par

## 2020-07-29 LAB
25(OH)D3 SERPL-MCNC: 27.3 NG/ML
ALBUMIN SERPL ELPH-MCNC: 4.2 G/DL
ALP BLD-CCNC: 52 U/L
ALT SERPL-CCNC: 19 U/L
ANION GAP SERPL CALC-SCNC: 13 MMOL/L
APPEARANCE: CLEAR
AST SERPL-CCNC: 19 U/L
BASOPHILS # BLD AUTO: 0.04 K/UL
BASOPHILS NFR BLD AUTO: 0.6 %
BILIRUB SERPL-MCNC: 1 MG/DL
BILIRUBIN URINE: NEGATIVE
BLOOD URINE: NEGATIVE
BUN SERPL-MCNC: 21 MG/DL
CALCIUM SERPL-MCNC: 9.5 MG/DL
CHLORIDE SERPL-SCNC: 112 MMOL/L
CHOLEST SERPL-MCNC: 176 MG/DL
CHOLEST/HDLC SERPL: 2.4 RATIO
CO2 SERPL-SCNC: 24 MMOL/L
COLOR: YELLOW
CREAT SERPL-MCNC: 1.17 MG/DL
EOSINOPHIL # BLD AUTO: 0.36 K/UL
EOSINOPHIL NFR BLD AUTO: 5.5 %
ESTIMATED AVERAGE GLUCOSE: 114 MG/DL
GLUCOSE QUALITATIVE U: NEGATIVE
GLUCOSE SERPL-MCNC: 104 MG/DL
HBA1C MFR BLD HPLC: 5.6 %
HCT VFR BLD CALC: 41.9 %
HDLC SERPL-MCNC: 73 MG/DL
HGB BLD-MCNC: 13.4 G/DL
IMM GRANULOCYTES NFR BLD AUTO: 0.3 %
KETONES URINE: NEGATIVE
LDLC SERPL CALC-MCNC: 89 MG/DL
LEUKOCYTE ESTERASE URINE: ABNORMAL
LYMPHOCYTES # BLD AUTO: 1.33 K/UL
LYMPHOCYTES NFR BLD AUTO: 20.5 %
MAN DIFF?: NORMAL
MCHC RBC-ENTMCNC: 30.6 PG
MCHC RBC-ENTMCNC: 32 GM/DL
MCV RBC AUTO: 95.7 FL
MONOCYTES # BLD AUTO: 0.45 K/UL
MONOCYTES NFR BLD AUTO: 6.9 %
NEUTROPHILS # BLD AUTO: 4.3 K/UL
NEUTROPHILS NFR BLD AUTO: 66.2 %
NITRITE URINE: NEGATIVE
PH URINE: 5.5
PLATELET # BLD AUTO: 157 K/UL
POTASSIUM SERPL-SCNC: 5.2 MMOL/L
PROT SERPL-MCNC: 5.7 G/DL
PROTEIN URINE: NEGATIVE
PSA SERPL-MCNC: 1.25 NG/ML
RBC # BLD: 4.38 M/UL
RBC # FLD: 13 %
SODIUM SERPL-SCNC: 149 MMOL/L
SPECIFIC GRAVITY URINE: 1.03
T3RU NFR SERPL: 1 TBI
T4 SERPL-MCNC: 5.3 UG/DL
TRIGL SERPL-MCNC: 73 MG/DL
TSH SERPL-ACNC: 1.58 UIU/ML
URATE SERPL-MCNC: 5.1 MG/DL
UROBILINOGEN URINE: NORMAL
WBC # FLD AUTO: 6.5 K/UL

## 2020-10-29 DIAGNOSIS — R79.89 OTHER SPECIFIED ABNORMAL FINDINGS OF BLOOD CHEMISTRY: ICD-10-CM

## 2020-12-21 PROBLEM — J06.9 ACUTE URI: Status: RESOLVED | Noted: 2019-12-09 | Resolved: 2020-12-21

## 2020-12-21 PROBLEM — Z12.11 ENCOUNTER FOR SCREENING COLONOSCOPY: Status: RESOLVED | Noted: 2019-12-31 | Resolved: 2020-12-21

## 2021-01-04 LAB
ALBUMIN SERPL ELPH-MCNC: 4.5 G/DL
ALP BLD-CCNC: 63 U/L
ALT SERPL-CCNC: 21 U/L
ANION GAP SERPL CALC-SCNC: 8 MMOL/L
AST SERPL-CCNC: 18 U/L
BASOPHILS # BLD AUTO: 0.05 K/UL
BASOPHILS NFR BLD AUTO: 0.8 %
BILIRUB SERPL-MCNC: 0.7 MG/DL
BUN SERPL-MCNC: 23 MG/DL
CALCIUM SERPL-MCNC: 10.1 MG/DL
CHLORIDE SERPL-SCNC: 104 MMOL/L
CHOLEST SERPL-MCNC: 236 MG/DL
CO2 SERPL-SCNC: 29 MMOL/L
CREAT SERPL-MCNC: 1.15 MG/DL
EOSINOPHIL # BLD AUTO: 0.42 K/UL
EOSINOPHIL NFR BLD AUTO: 6.8 %
GLUCOSE SERPL-MCNC: 97 MG/DL
HCT VFR BLD CALC: 48.3 %
HDLC SERPL-MCNC: 87 MG/DL
HGB BLD-MCNC: 15.6 G/DL
IMM GRANULOCYTES NFR BLD AUTO: 0.3 %
LDLC SERPL CALC-MCNC: 136 MG/DL
LYMPHOCYTES # BLD AUTO: 1.34 K/UL
LYMPHOCYTES NFR BLD AUTO: 21.7 %
MAN DIFF?: NORMAL
MCHC RBC-ENTMCNC: 30 PG
MCHC RBC-ENTMCNC: 32.3 GM/DL
MCV RBC AUTO: 92.9 FL
MONOCYTES # BLD AUTO: 0.62 K/UL
MONOCYTES NFR BLD AUTO: 10 %
NEUTROPHILS # BLD AUTO: 3.73 K/UL
NEUTROPHILS NFR BLD AUTO: 60.4 %
NONHDLC SERPL-MCNC: 149 MG/DL
PLATELET # BLD AUTO: 192 K/UL
POTASSIUM SERPL-SCNC: 5 MMOL/L
PROT SERPL-MCNC: 6.5 G/DL
RBC # BLD: 5.2 M/UL
RBC # FLD: 13.1 %
SAVE SPECIMEN: NORMAL
SODIUM SERPL-SCNC: 142 MMOL/L
TRIGL SERPL-MCNC: 69 MG/DL
WBC # FLD AUTO: 6.18 K/UL

## 2021-02-19 ENCOUNTER — APPOINTMENT (OUTPATIENT)
Dept: INTERNAL MEDICINE | Facility: CLINIC | Age: 66
End: 2021-02-19
Payer: MEDICARE

## 2021-02-19 VITALS
WEIGHT: 164 LBS | OXYGEN SATURATION: 97 % | TEMPERATURE: 96.2 F | HEART RATE: 51 BPM | HEIGHT: 68.5 IN | BODY MASS INDEX: 24.57 KG/M2

## 2021-02-19 VITALS — SYSTOLIC BLOOD PRESSURE: 118 MMHG | DIASTOLIC BLOOD PRESSURE: 72 MMHG

## 2021-02-19 DIAGNOSIS — N40.0 BENIGN PROSTATIC HYPERPLASIA WITHOUT LOWER URINARY TRACT SYMPMS: ICD-10-CM

## 2021-02-19 LAB
ALBUMIN SERPL ELPH-MCNC: 4.3 G/DL
ALP BLD-CCNC: 64 U/L
ALT SERPL-CCNC: 20 U/L
AST SERPL-CCNC: 19 U/L
BILIRUB DIRECT SERPL-MCNC: 0.2 MG/DL
BILIRUB INDIRECT SERPL-MCNC: 0.5 MG/DL
BILIRUB SERPL-MCNC: 0.7 MG/DL
CHOLEST SERPL-MCNC: 192 MG/DL
HDLC SERPL-MCNC: 72 MG/DL
LDLC SERPL CALC-MCNC: 107 MG/DL
NONHDLC SERPL-MCNC: 120 MG/DL
PROT SERPL-MCNC: 6 G/DL
SAVE SPECIMEN: NORMAL
TRIGL SERPL-MCNC: 65 MG/DL

## 2021-02-19 PROCEDURE — 99214 OFFICE O/P EST MOD 30 MIN: CPT

## 2021-02-19 NOTE — HISTORY OF PRESENT ILLNESS
[FreeTextEntry1] : This is a 66-year-old male for evaluation and treatment of his hypercholesterolemia weight prediabetic state and reflux [de-identified] : Patient is feeling well he has no systemic complaints.  He is taking his cholesterol medication\par \par He still does have difficulty with initiating stream but no nocturia\par \par His weight is stable

## 2021-02-19 NOTE — PHYSICAL EXAM
[Normal] : normal rate, regular rhythm, normal S1 and S2 and no murmur heard [No Focal Deficits] : no focal deficits [de-identified] : Anxious

## 2021-02-19 NOTE — ASSESSMENT
[FreeTextEntry1] : This is a 66-year-old male whose history has been reviewed above\par \par He has a history of hypercholesterolemia he remains on a statin his LDL is 107 we will make no changes at this time but I asked him to watch his diet and weight\par \par His overall weight is good but I still want him to lose a few pounds\par \par He has hesitancy but no nocturia no intervention\par \par His anxiety is situational he might benefit from a psychologist but I am not sure he will avail himself of the services\par \par He has complained of no reflux and is on no medications no intervention\par \par His last hemoglobin A1c was normal we did not repeat this

## 2021-11-22 ENCOUNTER — LABORATORY RESULT (OUTPATIENT)
Age: 66
End: 2021-11-22

## 2021-11-22 ENCOUNTER — APPOINTMENT (OUTPATIENT)
Dept: INTERNAL MEDICINE | Facility: CLINIC | Age: 66
End: 2021-11-22
Payer: MEDICARE

## 2021-11-22 ENCOUNTER — APPOINTMENT (OUTPATIENT)
Dept: CARDIOLOGY | Facility: CLINIC | Age: 66
End: 2021-11-22
Payer: MEDICARE

## 2021-11-22 ENCOUNTER — NON-APPOINTMENT (OUTPATIENT)
Age: 66
End: 2021-11-22

## 2021-11-22 VITALS
OXYGEN SATURATION: 95 % | DIASTOLIC BLOOD PRESSURE: 78 MMHG | HEIGHT: 68.5 IN | HEART RATE: 114 BPM | WEIGHT: 162 LBS | SYSTOLIC BLOOD PRESSURE: 122 MMHG | RESPIRATION RATE: 17 BRPM | BODY MASS INDEX: 24.27 KG/M2

## 2021-11-22 VITALS — SYSTOLIC BLOOD PRESSURE: 116 MMHG | DIASTOLIC BLOOD PRESSURE: 80 MMHG

## 2021-11-22 VITALS
BODY MASS INDEX: 24.27 KG/M2 | HEART RATE: 135 BPM | SYSTOLIC BLOOD PRESSURE: 120 MMHG | OXYGEN SATURATION: 98 % | DIASTOLIC BLOOD PRESSURE: 70 MMHG | HEIGHT: 68.5 IN | WEIGHT: 162 LBS

## 2021-11-22 PROCEDURE — 99214 OFFICE O/P EST MOD 30 MIN: CPT | Mod: 25

## 2021-11-22 PROCEDURE — 99205 OFFICE O/P NEW HI 60 MIN: CPT

## 2021-11-22 PROCEDURE — 93040 RHYTHM ECG WITH REPORT: CPT

## 2021-11-22 PROCEDURE — 36415 COLL VENOUS BLD VENIPUNCTURE: CPT

## 2021-11-22 PROCEDURE — 93000 ELECTROCARDIOGRAM COMPLETE: CPT

## 2021-11-22 NOTE — HISTORY OF PRESENT ILLNESS
[FreeTextEntry8] : This is a 66-year-old male who is complaining of 3 weeks of shortness of breath which is on and off.  He has been taking an inhaler on his own.  This usually comes at rest.  He has no other systemic symptoms\par \par In addition he has lost approximately 15 pounds.\par \par He has been using Ventolin inhaler he states virtually on an hourly basis

## 2021-11-22 NOTE — PHYSICAL EXAM
[Normal] : no acute distress, well nourished, well developed and well-appearing [de-identified] : Diffuse wheezing t [de-identified] : tachycardic

## 2021-11-22 NOTE — PHYSICAL EXAM
[Well Developed] : well developed [Well Nourished] : well nourished [No Acute Distress] : no acute distress [Normal Conjunctiva] : normal conjunctiva [Normal Venous Pressure] : normal venous pressure [No Carotid Bruit] : no carotid bruit [Normal S1, S2] : normal S1, S2 [No Murmur] : no murmur [No Rub] : no rub [No Gallop] : no gallop [Soft] : abdomen soft [Non Tender] : non-tender [No Masses/organomegaly] : no masses/organomegaly [Normal Bowel Sounds] : normal bowel sounds [Normal Gait] : normal gait [No Edema] : no edema [No Cyanosis] : no cyanosis [No Clubbing] : no clubbing [No Varicosities] : no varicosities [No Rash] : no rash [No Skin Lesions] : no skin lesions [Moves all extremities] : moves all extremities [No Focal Deficits] : no focal deficits [Normal Speech] : normal speech [Alert and Oriented] : alert and oriented [Normal memory] : normal memory [de-identified] : Tachycardic [de-identified] : Decreased breath sounds throughout with faint expiratory wheeze left greater than right.

## 2021-11-22 NOTE — ADDENDUM
[FreeTextEntry1] : After being seen by cardiology was decided not to put him on a long-acting beta adrenergic medication we will start steroids he will be seen by cardiology in 48 hours

## 2021-11-22 NOTE — ASSESSMENT
[FreeTextEntry1] : This is a 66-year-old male who has a history of childhood asthma.  Over the last 3 weeks he states he has been wheezing and coughing.  He has been using Ventolin.  He has an occasional nocturnal awakening\par \par He also has had a 15 pound weight loss and has a resting tachycardia along with diffuse wheezing\par \par We will obtain routine labs including a CBC to help evaluate a his weight loss and be eosinophilia\par \par We will obtain a chest x-ray to rule out infiltrative disease\par \par EKG was performed he was noted to be in atrial flutterI spoke to cardiology who will see him now\par \par \par We will start him on Breo Ellipta I will consider steroids he will call me in 24 hours and hopefully will be able to see him again in 48 depending on the results of cardiology's intervention\par \par I am worried about hypothyroidism as well as underlying oncology\par \par \par

## 2021-11-22 NOTE — PHYSICAL EXAM
[Well Developed] : well developed [Well Nourished] : well nourished [No Acute Distress] : no acute distress [Normal Conjunctiva] : normal conjunctiva [Normal Venous Pressure] : normal venous pressure [No Carotid Bruit] : no carotid bruit [Normal S1, S2] : normal S1, S2 [No Murmur] : no murmur [No Rub] : no rub [No Gallop] : no gallop [Soft] : abdomen soft [Non Tender] : non-tender [No Masses/organomegaly] : no masses/organomegaly [Normal Bowel Sounds] : normal bowel sounds [Normal Gait] : normal gait [No Edema] : no edema [No Cyanosis] : no cyanosis [No Clubbing] : no clubbing [No Varicosities] : no varicosities [No Rash] : no rash [No Skin Lesions] : no skin lesions [Moves all extremities] : moves all extremities [No Focal Deficits] : no focal deficits [Normal Speech] : normal speech [Alert and Oriented] : alert and oriented [Normal memory] : normal memory [de-identified] : Tachycardic [de-identified] : Decreased breath sounds throughout with faint expiratory wheeze left greater than right.

## 2021-11-22 NOTE — HISTORY OF PRESENT ILLNESS
[FreeTextEntry1] : Dear Evan,\dayanara Thank you for referring him for cardiovascular evaluation and management. He is a 66-year-old with a history of hyperlipidemia who presented your office with 2 weeks of wheezing. He was noted to be in a tachyarrhythmia and referred to my office.\par He describes wheezing over the past few weeks. He was treating these wheezing episodes with an albuterol inhaler fairly frequently but did not seek medical attention until today. He denies any lightheadedness, dizziness or syncope or history of arrhythmia.\par He has no chest pains or shortness of breath with exertion has been able to go about his usual activities without difficulty.\par He has no history of coronary artery disease, diabetes mellitus, stroke, renal insufficiency, smoking but does have a family history of coronary disease as his father had bypass in his 60s.\par

## 2021-11-22 NOTE — DISCUSSION/SUMMARY
[FreeTextEntry1] : He is a 66-year-old with hypercholesterolemia who presents with minimally symptomatic atrial flutter with a rapid ventricular response in the setting of what seems like asthma with increased albuterol use.\par We reviewed the pathophysiology of atrial flutter and the fact that it may be triggered by his pulmonary disease or the frequent albuterol use.\par After reviewing the case with you we agree to begin oral prednisone for his asthma and I will begin rate control with diltiazem 180 mg long-acting daily.\par He will begin oral anticoagulation with Eliquis for stroke risk reduction, though he is borderline with regard to risk factors.\par We discussed the possibilities of electrical cardioversion if treatment of his underlying asthma and diltiazem are not successful in changing his rhythm back to sinus.\par He will follow-up with me in 2 days.

## 2021-11-23 LAB
25(OH)D3 SERPL-MCNC: 28.9 NG/ML
ALBUMIN SERPL ELPH-MCNC: 4.7 G/DL
ALP BLD-CCNC: 90 U/L
ALT SERPL-CCNC: 21 U/L
ANION GAP SERPL CALC-SCNC: 14 MMOL/L
AST SERPL-CCNC: 19 U/L
BASOPHILS # BLD AUTO: 0.07 K/UL
BASOPHILS NFR BLD AUTO: 0.8 %
BILIRUB SERPL-MCNC: 0.7 MG/DL
BUN SERPL-MCNC: 24 MG/DL
CALCIUM SERPL-MCNC: 10.7 MG/DL
CHLORIDE SERPL-SCNC: 105 MMOL/L
CHOLEST SERPL-MCNC: 208 MG/DL
CO2 SERPL-SCNC: 25 MMOL/L
CREAT SERPL-MCNC: 1.14 MG/DL
EOSINOPHIL # BLD AUTO: 0.52 K/UL
EOSINOPHIL NFR BLD AUTO: 5.6 %
ESTIMATED AVERAGE GLUCOSE: 120 MG/DL
GLUCOSE SERPL-MCNC: 98 MG/DL
HBA1C MFR BLD HPLC: 5.8 %
HCT VFR BLD CALC: 51.4 %
HDLC SERPL-MCNC: 76 MG/DL
HGB BLD-MCNC: 16.6 G/DL
IMM GRANULOCYTES NFR BLD AUTO: 0.2 %
LDLC SERPL CALC-MCNC: 117 MG/DL
LYMPHOCYTES # BLD AUTO: 1.95 K/UL
LYMPHOCYTES NFR BLD AUTO: 21.1 %
MAN DIFF?: NORMAL
MCHC RBC-ENTMCNC: 30.5 PG
MCHC RBC-ENTMCNC: 32.3 GM/DL
MCV RBC AUTO: 94.3 FL
MONOCYTES # BLD AUTO: 0.82 K/UL
MONOCYTES NFR BLD AUTO: 8.9 %
NEUTROPHILS # BLD AUTO: 5.84 K/UL
NEUTROPHILS NFR BLD AUTO: 63.4 %
NONHDLC SERPL-MCNC: 132 MG/DL
PLATELET # BLD AUTO: 318 K/UL
POTASSIUM SERPL-SCNC: 5.6 MMOL/L
PROT SERPL-MCNC: 6.9 G/DL
RBC # BLD: 5.45 M/UL
RBC # FLD: 13.1 %
SODIUM SERPL-SCNC: 144 MMOL/L
T3RU NFR SERPL: 1.1 TBI
T4 SERPL-MCNC: 7 UG/DL
TRIGL SERPL-MCNC: 75 MG/DL
TSH SERPL-ACNC: 1.56 UIU/ML
URATE SERPL-MCNC: 5.9 MG/DL
WBC # FLD AUTO: 9.22 K/UL

## 2021-11-24 ENCOUNTER — APPOINTMENT (OUTPATIENT)
Dept: CARDIOLOGY | Facility: CLINIC | Age: 66
End: 2021-11-24
Payer: MEDICARE

## 2021-11-24 ENCOUNTER — APPOINTMENT (OUTPATIENT)
Dept: RADIOLOGY | Facility: CLINIC | Age: 66
End: 2021-11-24
Payer: MEDICARE

## 2021-11-24 ENCOUNTER — NON-APPOINTMENT (OUTPATIENT)
Age: 66
End: 2021-11-24

## 2021-11-24 ENCOUNTER — OUTPATIENT (OUTPATIENT)
Dept: OUTPATIENT SERVICES | Facility: HOSPITAL | Age: 66
LOS: 1 days | End: 2021-11-24
Payer: MEDICARE

## 2021-11-24 VITALS
DIASTOLIC BLOOD PRESSURE: 84 MMHG | BODY MASS INDEX: 24.27 KG/M2 | OXYGEN SATURATION: 98 % | HEART RATE: 142 BPM | HEIGHT: 68.5 IN | WEIGHT: 162 LBS | SYSTOLIC BLOOD PRESSURE: 112 MMHG | RESPIRATION RATE: 17 BRPM

## 2021-11-24 DIAGNOSIS — J18.9 PNEUMONIA, UNSPECIFIED ORGANISM: ICD-10-CM

## 2021-11-24 DIAGNOSIS — R63.4 ABNORMAL WEIGHT LOSS: ICD-10-CM

## 2021-11-24 DIAGNOSIS — J45.909 UNSPECIFIED ASTHMA, UNCOMPLICATED: ICD-10-CM

## 2021-11-24 DIAGNOSIS — R73.03 PREDIABETES: ICD-10-CM

## 2021-11-24 DIAGNOSIS — R06.02 SHORTNESS OF BREATH: ICD-10-CM

## 2021-11-24 PROCEDURE — 93000 ELECTROCARDIOGRAM COMPLETE: CPT

## 2021-11-24 PROCEDURE — 99215 OFFICE O/P EST HI 40 MIN: CPT

## 2021-11-24 PROCEDURE — 71046 X-RAY EXAM CHEST 2 VIEWS: CPT | Mod: 26

## 2021-11-24 PROCEDURE — 71046 X-RAY EXAM CHEST 2 VIEWS: CPT

## 2021-11-24 NOTE — HISTORY OF PRESENT ILLNESS
[FreeTextEntry1] : He is taking his medications as directed.\par Feels okay. No chest pain.\par No dizziness.\par Primatine mist use last week was noted.\par \par Prior:\par Dear Evan\par Thank you for referring him for cardiovascular evaluation and management. He is a 66-year-old with a history of hyperlipidemia who presented your office with 2 weeks of wheezing. He was noted to be in a tachyarrhythmia and referred to my office.\par He describes wheezing over the past few weeks. He was treating these wheezing episodes with an albuterol inhaler fairly frequently but did not seek medical attention until today. He denies any lightheadedness, dizziness or syncope or history of arrhythmia.\par He has no chest pains or shortness of breath with exertion has been able to go about his usual activities without difficulty.\par He has no history of coronary artery disease, diabetes mellitus, stroke, renal insufficiency, smoking but does have a family history of coronary disease as his father had bypass in his 60s.\par

## 2021-11-24 NOTE — DISCUSSION/SUMMARY
[FreeTextEntry1] : He is a 66-year-old with hypercholesterolemia who presents with minimally symptomatic atrial flutter with a persistent rapid ventricular response in the setting of what seems like asthma with increased albuterol use.\par His asthma appears to have improved with prednisone.\par I have doubled his diltiazem to 180 mg twice daily which he will continue through Monday at which time he will follow-up with me.  If no significant rate or rhythm changes are seen I will arrange for DC cardioversion on Wednesday.\par He should continue oral anticoagulation for stroke risk reduction.\par I repeated the blood work for his elevated calcium.\par I have also suggested that he go for his chest x-ray that he suggested last visit.\par

## 2021-11-24 NOTE — PHYSICAL EXAM
[Well Developed] : well developed [Well Nourished] : well nourished [No Acute Distress] : no acute distress [Normal Conjunctiva] : normal conjunctiva [Normal Venous Pressure] : normal venous pressure [No Carotid Bruit] : no carotid bruit [Normal S1, S2] : normal S1, S2 [No Murmur] : no murmur [No Rub] : no rub [No Gallop] : no gallop [Soft] : abdomen soft [Non Tender] : non-tender [No Masses/organomegaly] : no masses/organomegaly [Normal Bowel Sounds] : normal bowel sounds [Normal Gait] : normal gait [No Edema] : no edema [No Cyanosis] : no cyanosis [No Clubbing] : no clubbing [No Varicosities] : no varicosities [No Rash] : no rash [No Skin Lesions] : no skin lesions [Moves all extremities] : moves all extremities [No Focal Deficits] : no focal deficits [Normal Speech] : normal speech [Alert and Oriented] : alert and oriented [Normal memory] : normal memory [de-identified] : Tachycardic [de-identified] : Decreased breath sounds throughout with faint expiratory wheeze left greater than right.

## 2021-11-29 ENCOUNTER — INPATIENT (INPATIENT)
Facility: HOSPITAL | Age: 66
LOS: 0 days | Discharge: HOME | DRG: 274 | End: 2021-11-30
Attending: INTERNAL MEDICINE | Admitting: HOSPITALIST
Payer: COMMERCIAL

## 2021-11-29 ENCOUNTER — NON-APPOINTMENT (OUTPATIENT)
Age: 66
End: 2021-11-29

## 2021-11-29 ENCOUNTER — APPOINTMENT (OUTPATIENT)
Dept: CARDIOLOGY | Facility: CLINIC | Age: 66
End: 2021-11-29
Payer: MEDICARE

## 2021-11-29 VITALS
SYSTOLIC BLOOD PRESSURE: 106 MMHG | DIASTOLIC BLOOD PRESSURE: 70 MMHG | BODY MASS INDEX: 24.57 KG/M2 | HEART RATE: 132 BPM | OXYGEN SATURATION: 98 % | WEIGHT: 164 LBS

## 2021-11-29 VITALS
RESPIRATION RATE: 20 BRPM | WEIGHT: 164.02 LBS | TEMPERATURE: 98 F | HEART RATE: 146 BPM | OXYGEN SATURATION: 96 % | DIASTOLIC BLOOD PRESSURE: 61 MMHG | HEIGHT: 70 IN | SYSTOLIC BLOOD PRESSURE: 116 MMHG

## 2021-11-29 LAB
ALBUMIN SERPL ELPH-MCNC: 4.3 G/DL — SIGNIFICANT CHANGE UP (ref 3.3–5)
ALP SERPL-CCNC: 73 U/L — SIGNIFICANT CHANGE UP (ref 40–120)
ALT FLD-CCNC: 23 U/L — SIGNIFICANT CHANGE UP (ref 10–45)
ANION GAP SERPL CALC-SCNC: 13 MMOL/L — SIGNIFICANT CHANGE UP (ref 5–17)
APTT BLD: 32.5 SEC — SIGNIFICANT CHANGE UP (ref 27.5–35.5)
AST SERPL-CCNC: 12 U/L — SIGNIFICANT CHANGE UP (ref 10–40)
BASOPHILS # BLD AUTO: 0.02 K/UL — SIGNIFICANT CHANGE UP (ref 0–0.2)
BASOPHILS NFR BLD AUTO: 0.1 % — SIGNIFICANT CHANGE UP (ref 0–2)
BILIRUB SERPL-MCNC: 0.3 MG/DL — SIGNIFICANT CHANGE UP (ref 0.2–1.2)
BUN SERPL-MCNC: 30 MG/DL — HIGH (ref 7–23)
CALCIUM SERPL-MCNC: 9.4 MG/DL — SIGNIFICANT CHANGE UP (ref 8.4–10.5)
CHLORIDE SERPL-SCNC: 103 MMOL/L — SIGNIFICANT CHANGE UP (ref 96–108)
CO2 SERPL-SCNC: 25 MMOL/L — SIGNIFICANT CHANGE UP (ref 22–31)
CREAT SERPL-MCNC: 1.09 MG/DL — SIGNIFICANT CHANGE UP (ref 0.5–1.3)
EOSINOPHIL # BLD AUTO: 0.08 K/UL — SIGNIFICANT CHANGE UP (ref 0–0.5)
EOSINOPHIL NFR BLD AUTO: 0.4 % — SIGNIFICANT CHANGE UP (ref 0–6)
GLUCOSE SERPL-MCNC: 139 MG/DL — HIGH (ref 70–99)
HCT VFR BLD CALC: 45.1 % — SIGNIFICANT CHANGE UP (ref 39–50)
HGB BLD-MCNC: 14.8 G/DL — SIGNIFICANT CHANGE UP (ref 13–17)
IMM GRANULOCYTES NFR BLD AUTO: 0.9 % — SIGNIFICANT CHANGE UP (ref 0–1.5)
INR BLD: 1.17 RATIO — HIGH (ref 0.88–1.16)
LYMPHOCYTES # BLD AUTO: 1.18 K/UL — SIGNIFICANT CHANGE UP (ref 1–3.3)
LYMPHOCYTES # BLD AUTO: 6.4 % — LOW (ref 13–44)
MCHC RBC-ENTMCNC: 30.2 PG — SIGNIFICANT CHANGE UP (ref 27–34)
MCHC RBC-ENTMCNC: 32.8 GM/DL — SIGNIFICANT CHANGE UP (ref 32–36)
MCV RBC AUTO: 92 FL — SIGNIFICANT CHANGE UP (ref 80–100)
MONOCYTES # BLD AUTO: 1.16 K/UL — HIGH (ref 0–0.9)
MONOCYTES NFR BLD AUTO: 6.3 % — SIGNIFICANT CHANGE UP (ref 2–14)
NEUTROPHILS # BLD AUTO: 15.95 K/UL — HIGH (ref 1.8–7.4)
NEUTROPHILS NFR BLD AUTO: 85.9 % — HIGH (ref 43–77)
NRBC # BLD: 0 /100 WBCS — SIGNIFICANT CHANGE UP (ref 0–0)
PLATELET # BLD AUTO: 307 K/UL — SIGNIFICANT CHANGE UP (ref 150–400)
POTASSIUM SERPL-MCNC: 4.6 MMOL/L — SIGNIFICANT CHANGE UP (ref 3.5–5.3)
POTASSIUM SERPL-SCNC: 4.6 MMOL/L — SIGNIFICANT CHANGE UP (ref 3.5–5.3)
PROT SERPL-MCNC: 6.5 G/DL — SIGNIFICANT CHANGE UP (ref 6–8.3)
PROTHROM AB SERPL-ACNC: 13.9 SEC — HIGH (ref 10.6–13.6)
RBC # BLD: 4.9 M/UL — SIGNIFICANT CHANGE UP (ref 4.2–5.8)
RBC # FLD: 13.2 % — SIGNIFICANT CHANGE UP (ref 10.3–14.5)
SODIUM SERPL-SCNC: 141 MMOL/L — SIGNIFICANT CHANGE UP (ref 135–145)
TROPONIN T, HIGH SENSITIVITY RESULT: 23 NG/L — SIGNIFICANT CHANGE UP (ref 0–51)
WBC # BLD: 18.55 K/UL — HIGH (ref 3.8–10.5)
WBC # FLD AUTO: 18.55 K/UL — HIGH (ref 3.8–10.5)

## 2021-11-29 PROCEDURE — 93000 ELECTROCARDIOGRAM COMPLETE: CPT | Mod: PD

## 2021-11-29 PROCEDURE — 99215 OFFICE O/P EST HI 40 MIN: CPT

## 2021-11-29 PROCEDURE — 99285 EMERGENCY DEPT VISIT HI MDM: CPT

## 2021-11-29 RX ORDER — DILTIAZEM HCL 120 MG
20 CAPSULE, EXT RELEASE 24 HR ORAL ONCE
Refills: 0 | Status: COMPLETED | OUTPATIENT
Start: 2021-11-29 | End: 2021-11-29

## 2021-11-29 RX ADMIN — Medication 20 MILLIGRAM(S): at 23:26

## 2021-11-29 NOTE — ED PROVIDER NOTE - ATTENDING CONTRIBUTION TO CARE
Dr. Saul (Attending Physician)  I performed a history and physical exam of the patient and discussed their management with the resident. I reviewed the resident's note and agree with the documented findings and plan of care. My medical decision making and observations are found above.

## 2021-11-29 NOTE — ED ADULT NURSE NOTE - OBJECTIVE STATEMENT
PT A&Ox4, ambulatory with steady gait. Pt presented to ED as per cardiologist request due to tachycardia. Pt states he takes something for his cholesterol and has hx of asthma. Was recently seen in cardiologist office on 11/19 and found to have a high heart rate. Was started on medications to help control the rate as well as a blood thinner. tonight he was encouraged by cardiologist to come to the ED for a cardioversion.  Pt denies CP, SOB, palpitations, N/V/D, dizziness, LOC, weakness, numbness, tingling, fever, chills, back or abdominal pain.

## 2021-11-29 NOTE — ED ADULT TRIAGE NOTE - CHIEF COMPLAINT QUOTE
elevated HR at outpatient cardiologist, was told to come today to "receive medication to make his HR go down." denies chest pain, denies sensation of palpitations.

## 2021-11-29 NOTE — ED ADULT NURSE NOTE - NSIMPLEMENTINTERV_GEN_ALL_ED
Implemented All Fall with Harm Risk Interventions:  [place] to call system. Call bell, personal items and telephone within reach. Instruct patient to call for assistance. Room bathroom lighting operational. Non-slip footwear when patient is off stretcher. Physically safe environment: no spills, clutter or unnecessary equipment. Stretcher in lowest position, wheels locked, appropriate side rails in place. Provide visual cue, wrist band, yellow gown, etc. Monitor gait and stability. Monitor for mental status changes and reorient to person, place, and time. Review medications for side effects contributing to fall risk. Reinforce activity limits and safety measures with patient and family. Provide visual clues: red socks.

## 2021-11-29 NOTE — ED PROVIDER NOTE - RAPID ASSESSMENT
66y M p/w palpitations. Pt reports he had elevated HR at outpt cardiologist (Dr. Lisker Jay) on 11/19. Was started onEliquis and diltiazem at that time. Denies dizziness, lightheadedness, syncope, CP.     Patient was seen as a tele QDOC patient. The patient will be seen and further worked up in the main emergency department and their care will be completed by the main emergency department team along with a thorough physical exam. Receiving team will follow up on labs, analgesia, any clinical imaging, reassess and disposition as clinically indicated, all decisions regarding the progression of care will be made at their discretion.    Scribe Statement: Kenrick TOLEDO, attest that this documentation has been prepared under the direction and in the presence of Blank Keith (DO) 66y M p/w palpitations found to be in tachydysrhthymia in setting of asthma exacerbation. Saw Outpt cardiologist (Dr. Lisker Jay) on 11/19 and 11/24. Was started on Eliquis and diltiazem 11/19. Denies dizziness, lightheadedness, syncope, CP. Here for cardioversion.     Patient was seen as a tele QDOC patient. The patient will be seen and further worked up in the main emergency department and their care will be completed by the main emergency department team along with a thorough physical exam. Receiving team will follow up on labs, analgesia, any clinical imaging, reassess and disposition as clinically indicated, all decisions regarding the progression of care will be made at their discretion.    Scribe Statement: I, Kenrick Maharaj, attest that this documentation has been prepared under the direction and in the presence of Blank Keith ()  I, Dr. Keith, personally performed the service described in the documentation recorded by the scribe in my presence, and it accurately and completely records my words and actions.

## 2021-11-29 NOTE — ED PROVIDER NOTE - CLINICAL SUMMARY MEDICAL DECISION MAKING FREE TEXT BOX
66y M PMH HLD recently dx AF (followed by Dr. Jay Lisker) started on diltiazem, nick 11/19 sent to the hospital for admission by Dr. Lisker for possible cardioversion and further management of AF. Asymptomatic currently. HR 140s and irregular on EKG, BP stable, afebrile, asymptomatic. Will obtain basic labs, cardiac enzymes, proceed w/ diltiazem 20mg IV x1 and IVF and reassess. 66y M PMH HLD recently dx AF (followed by Dr. Jay Lisker) started on diltiazem, nick 11/19 sent to the hospital for admission by Dr. Lisker for possible cardioversion and further management of AF. Asymptomatic currently. HR 140s and irregular on EKG, BP stable, afebrile, asymptomatic. Will obtain basic labs, cardiac enzymes, proceed w/ diltiazem 20mg IV x1 and IVF and reassess.    Dr. Saul (Attending Physician)

## 2021-11-29 NOTE — ED PROVIDER NOTE - PHYSICAL EXAMINATION
PHYSICAL EXAM:  GENERAL: Sitting comfortable in bed, in no acute distress  HENMT: Atraumatic, moist mucous membranes  EYES: Clear bilaterally, PERRL, EOMs intact b/l  HEART: tachycardic irregular rhythm, nl S1/S2, no murmur/gallops/rubs  RESPIRATORY: Clear to auscultation bilaterally, no wheezes/rhonchi/rales  ABDOMEN: +BS, soft, nontender, nondistended  EXTREMITIES: No lower extremity edema, +2 radial pulses b/l  NEURO:  A&Ox4, no focal motor deficits or sensory deficits   Heme/LYMPH: No ecchymosis or bruising, no anterior/posterior cervical or supraclavicular LAD  SKIN:  Skin warm, dry and intact. No evidence of rash.

## 2021-11-29 NOTE — ED PROVIDER NOTE - PROGRESS NOTE DETAILS
Michaela EM/IM PGY4: Pt HR improved to 70s after diltiazem 20mg IVx1 noted to be in aflutter on repeat EKG. Cards consulted and pt endorsed to hospitalist for admission on telemetry.

## 2021-11-29 NOTE — ED PROVIDER NOTE - OBJECTIVE STATEMENT
66y M PMH HLD recently dx AF (followed by Dr. Jay Lisker) started on diltiazem, nick 11/19 sent to the hospital for admission by Dr. Lisker for possible cardioversion and further management of AF. Pt notes he had bronchitis 3 weeks ago and was start 66y M PMH HLD recently dx AF (followed by Dr. Jay Lisker) started on diltiazem, elqiedith 11/19 sent to the hospital for admission by Dr. Lisker for possible cardioversion and further management of AF. Pt notes he had bronchitis 3 weeks ago and was started on inhalers and steroids. He was seen by PMD who noted tachycardia thus was sent to cardiologist Dr. Lisker who noted pt to be in AF on 11/19 and was started on eliquis and diltiazem. On f/u appt 11/24 noted to have persistent elevated HR and diltiazem was increased to 180mg BID, at f/u today pt had persistent HR in the 140s thus was sent to the hospital for admission for cardioversion. PT denies any chest pain, SOB, palpitations, lightheadedness/syncope, abd pain, n/v, focal numbness/weakness or any other complaints at this time.

## 2021-11-29 NOTE — ED PROVIDER NOTE - NS ED ROS FT
Constitutional: no fever and no chills  Eyes: no discharge, no pain, no visual changes  ENMT: no ear pain, no dysphagia or throat pain  Neck: no pain, no stiffness  CV: no chest pain, no palpitations, no edema  Resp: no cough, no shortness of breath  Abd: no abdominal pain, no nausea or vomiting, no diarrhea  : no dysuria, no hematuria  MSK: no back pain, no neck pain, no joint pain  Neuro: no LOC, no gait abnormality, no headache, no sensory deficits, no weakness  Skin: no rashes, no lacerations

## 2021-11-30 VITALS
OXYGEN SATURATION: 99 % | DIASTOLIC BLOOD PRESSURE: 66 MMHG | RESPIRATION RATE: 18 BRPM | HEART RATE: 66 BPM | SYSTOLIC BLOOD PRESSURE: 101 MMHG

## 2021-11-30 DIAGNOSIS — J20.9 ACUTE BRONCHITIS, UNSPECIFIED: ICD-10-CM

## 2021-11-30 DIAGNOSIS — Z29.9 ENCOUNTER FOR PROPHYLACTIC MEASURES, UNSPECIFIED: ICD-10-CM

## 2021-11-30 DIAGNOSIS — I48.92 UNSPECIFIED ATRIAL FLUTTER: ICD-10-CM

## 2021-11-30 DIAGNOSIS — I48.91 UNSPECIFIED ATRIAL FIBRILLATION: ICD-10-CM

## 2021-11-30 DIAGNOSIS — E78.5 HYPERLIPIDEMIA, UNSPECIFIED: ICD-10-CM

## 2021-11-30 DIAGNOSIS — D72.829 ELEVATED WHITE BLOOD CELL COUNT, UNSPECIFIED: ICD-10-CM

## 2021-11-30 LAB
ANION GAP SERPL CALC-SCNC: 14 MMOL/L
BLD GP AB SCN SERPL QL: NEGATIVE — SIGNIFICANT CHANGE UP
BUN SERPL-MCNC: 26 MG/DL
CALCIUM SERPL-MCNC: 10.3 MG/DL
CALCIUM SERPL-MCNC: 10.3 MG/DL
CHLORIDE SERPL-SCNC: 103 MMOL/L
CO2 SERPL-SCNC: 24 MMOL/L
CREAT SERPL-MCNC: 1.07 MG/DL
GLUCOSE SERPL-MCNC: 135 MG/DL
PARATHYROID HORMONE INTACT: 49 PG/ML
POTASSIUM SERPL-SCNC: 4.9 MMOL/L
RH IG SCN BLD-IMP: POSITIVE — SIGNIFICANT CHANGE UP
SARS-COV-2 RNA SPEC QL NAA+PROBE: SIGNIFICANT CHANGE UP
SODIUM SERPL-SCNC: 142 MMOL/L
TSH SERPL-MCNC: 1.1 UIU/ML — SIGNIFICANT CHANGE UP (ref 0.27–4.2)

## 2021-11-30 PROCEDURE — 99221 1ST HOSP IP/OBS SF/LOW 40: CPT | Mod: 25

## 2021-11-30 PROCEDURE — 93325 DOPPLER ECHO COLOR FLOW MAPG: CPT | Mod: 26

## 2021-11-30 PROCEDURE — 99223 1ST HOSP IP/OBS HIGH 75: CPT

## 2021-11-30 PROCEDURE — 93653 COMPRE EP EVAL TX SVT: CPT

## 2021-11-30 PROCEDURE — 93613 INTRACARDIAC EPHYS 3D MAPG: CPT

## 2021-11-30 PROCEDURE — 93010 ELECTROCARDIOGRAM REPORT: CPT

## 2021-11-30 PROCEDURE — 93623 PRGRMD STIMJ&PACG IV RX NFS: CPT | Mod: 26

## 2021-11-30 PROCEDURE — 93312 ECHO TRANSESOPHAGEAL: CPT | Mod: 26

## 2021-11-30 PROCEDURE — 93320 DOPPLER ECHO COMPLETE: CPT | Mod: 26

## 2021-11-30 PROCEDURE — 12345: CPT | Mod: NC

## 2021-11-30 RX ORDER — ONDANSETRON 8 MG/1
4 TABLET, FILM COATED ORAL EVERY 8 HOURS
Refills: 0 | Status: DISCONTINUED | OUTPATIENT
Start: 2021-11-30 | End: 2021-11-30

## 2021-11-30 RX ORDER — DILTIAZEM HCL 120 MG
180 CAPSULE, EXT RELEASE 24 HR ORAL DAILY
Refills: 0 | Status: DISCONTINUED | OUTPATIENT
Start: 2021-11-30 | End: 2021-11-30

## 2021-11-30 RX ORDER — APIXABAN 2.5 MG/1
10 TABLET, FILM COATED ORAL
Qty: 0 | Refills: 0 | DISCHARGE

## 2021-11-30 RX ORDER — LANOLIN ALCOHOL/MO/W.PET/CERES
3 CREAM (GRAM) TOPICAL AT BEDTIME
Refills: 0 | Status: DISCONTINUED | OUTPATIENT
Start: 2021-11-30 | End: 2021-11-30

## 2021-11-30 RX ORDER — APIXABAN 2.5 MG/1
5 TABLET, FILM COATED ORAL EVERY 12 HOURS
Refills: 0 | Status: DISCONTINUED | OUTPATIENT
Start: 2021-11-30 | End: 2021-11-30

## 2021-11-30 RX ORDER — METOPROLOL TARTRATE 50 MG
50 TABLET ORAL DAILY
Refills: 0 | Status: DISCONTINUED | OUTPATIENT
Start: 2021-11-30 | End: 2021-11-30

## 2021-11-30 RX ORDER — METOPROLOL TARTRATE 50 MG
1 TABLET ORAL
Qty: 30 | Refills: 0
Start: 2021-11-30 | End: 2021-12-29

## 2021-11-30 RX ORDER — PANTOPRAZOLE SODIUM 20 MG/1
1 TABLET, DELAYED RELEASE ORAL
Qty: 30 | Refills: 0
Start: 2021-11-30 | End: 2021-12-29

## 2021-11-30 RX ORDER — ACETAMINOPHEN 500 MG
650 TABLET ORAL EVERY 6 HOURS
Refills: 0 | Status: DISCONTINUED | OUTPATIENT
Start: 2021-11-30 | End: 2021-11-30

## 2021-11-30 RX ORDER — APIXABAN 2.5 MG/1
5 TABLET, FILM COATED ORAL
Qty: 0 | Refills: 0 | DISCHARGE

## 2021-11-30 RX ORDER — METOPROLOL TARTRATE 50 MG
25 TABLET ORAL DAILY
Refills: 0 | Status: DISCONTINUED | OUTPATIENT
Start: 2021-12-01 | End: 2021-11-30

## 2021-11-30 RX ORDER — DILTIAZEM HCL 120 MG
240 CAPSULE, EXT RELEASE 24 HR ORAL DAILY
Refills: 0 | Status: DISCONTINUED | OUTPATIENT
Start: 2021-11-30 | End: 2021-11-30

## 2021-11-30 RX ORDER — PANTOPRAZOLE SODIUM 20 MG/1
1 TABLET, DELAYED RELEASE ORAL
Qty: 0 | Refills: 0 | DISCHARGE
Start: 2021-11-30

## 2021-11-30 RX ORDER — DILTIAZEM HCL 120 MG
1 CAPSULE, EXT RELEASE 24 HR ORAL
Qty: 0 | Refills: 0 | DISCHARGE

## 2021-11-30 RX ORDER — ATORVASTATIN CALCIUM 80 MG/1
40 TABLET, FILM COATED ORAL AT BEDTIME
Refills: 0 | Status: DISCONTINUED | OUTPATIENT
Start: 2021-11-30 | End: 2021-11-30

## 2021-11-30 RX ORDER — PANTOPRAZOLE SODIUM 20 MG/1
40 TABLET, DELAYED RELEASE ORAL
Refills: 0 | Status: DISCONTINUED | OUTPATIENT
Start: 2021-11-30 | End: 2021-11-30

## 2021-11-30 RX ORDER — ROSUVASTATIN CALCIUM 5 MG/1
1 TABLET ORAL
Qty: 0 | Refills: 0 | DISCHARGE

## 2021-11-30 RX ADMIN — APIXABAN 5 MILLIGRAM(S): 2.5 TABLET, FILM COATED ORAL at 17:32

## 2021-11-30 RX ADMIN — PANTOPRAZOLE SODIUM 40 MILLIGRAM(S): 20 TABLET, DELAYED RELEASE ORAL at 14:38

## 2021-11-30 RX ADMIN — Medication 180 MILLIGRAM(S): at 05:47

## 2021-11-30 RX ADMIN — APIXABAN 5 MILLIGRAM(S): 2.5 TABLET, FILM COATED ORAL at 05:47

## 2021-11-30 NOTE — H&P ADULT - ASSESSMENT
66M PMH HLD and newly dx'd Atrial flutter presenting with uncontrolled HR in spite of escalating doses of CCB and in setting of albuterol use for bronchitis. Admitted to medicine for possible electrical cardioversion

## 2021-11-30 NOTE — H&P ADULT - NSHPLABSRESULTS_GEN_ALL_CORE
I have personally reviewed imaging; clear chest  I have personally reviewed EKG; atrial flutter w/ 4:1 AV conduction

## 2021-11-30 NOTE — PRE-ANESTHESIA EVALUATION ADULT - NSANTHOSAYNRD_GEN_A_CORE
No. AMINAH screening performed.  STOP BANG Legend: 0-2 = LOW Risk; 3-4 = INTERMEDIATE Risk; 5-8 = HIGH Risk

## 2021-11-30 NOTE — ASU PATIENT PROFILE, ADULT - NS TRANSFER PATIENT BELONGINGS
Christel, money, 3 gift cards/Cell Phone/PDA (specify)/Electronic Device (specify)/Jewelry/Money (specify)/Clothing

## 2021-11-30 NOTE — PROGRESS NOTE ADULT - SUBJECTIVE AND OBJECTIVE BOX
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     MEDICATIONS:  apixaban 5 milliGRAM(s) Oral every 12 hours  metoprolol succinate ER 50 milliGRAM(s) Oral daily  acetaminophen     Tablet .. 650 milliGRAM(s) Oral every 6 hours PRN  melatonin 3 milliGRAM(s) Oral at bedtime PRN  ondansetron Injectable 4 milliGRAM(s) IV Push every 8 hours PRN  aluminum hydroxide/magnesium hydroxide/simethicone Suspension 30 milliLiter(s) Oral every 4 hours PRN  pantoprazole    Tablet 40 milliGRAM(s) Oral before breakfast  atorvastatin 40 milliGRAM(s) Oral at bedtime        REVIEW OF SYSTEMS:  Complete 10point ROS negative.    PHYSICAL EXAM:  T(C): 36.6 (11-30-21 @ 13:10), Max: 36.7 (11-30-21 @ 05:22)  HR: 58 (11-30-21 @ 14:40) (51 - 146)  BP: 95/52 (11-30-21 @ 14:10) (90/49 - 122/66)  RR: 17 (11-30-21 @ 14:10) (14 - 20)  SpO2: 97% (11-30-21 @ 14:40) (96% - 100%)  Wt(kg): --  I&O's Summary      Appearance: Normal	  Cardiovascular: Normal S1 S2, No JVD, No murmurs, No edema  Respiratory: Lungs clear to auscultation	  Psychiatry: A & O x 3, Mood & affect appropriate  Gastrointestinal:  Soft, Non-tender, + BS	  Skin: No rashes, No ecchymoses, No cyanosis	  Neurologic: Non-focal  Extremities: No BLE edema        LABS:	 	    CBC Full  -  ( 29 Nov 2021 22:22 )  WBC Count : 18.55 K/uL  Hemoglobin : 14.8 g/dL  Hematocrit : 45.1 %  Platelet Count - Automated : 307 K/uL  Mean Cell Volume : 92.0 fl  Mean Cell Hemoglobin : 30.2 pg  Mean Cell Hemoglobin Concentration : 32.8 gm/dL  Auto Neutrophil # : 15.95 K/uL  Auto Lymphocyte # : 1.18 K/uL  Auto Monocyte # : 1.16 K/uL  Auto Eosinophil # : 0.08 K/uL  Auto Basophil # : 0.02 K/uL  Auto Neutrophil % : 85.9 %  Auto Lymphocyte % : 6.4 %  Auto Monocyte % : 6.3 %  Auto Eosinophil % : 0.4 %  Auto Basophil % : 0.1 %    11-29    141  |  103  |  30<H>  ----------------------------<  139<H>  4.6   |  25  |  1.09    Ca    9.4      29 Nov 2021 22:22  Phos  4.1     11-29  Mg     2.4     11-29    TPro  6.5  /  Alb  4.3  /  TBili  0.3  /  DBili  x   /  AST  12  /  ALT  23  /  AlkPhos  73  11-29      proBNP:   Lipid Profile:   HgA1c:   TSH:       CARDIAC MARKERS:          TELEMETRY: 	    ECG:  	  RADIOLOGY:  OTHER: 	    PREVIOUS DIAGNOSTIC TESTING:    [ ] Echocardiogram:    [ ]  Catheterization:  [ ] Stress Test:  	  	     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 Tele sinus rhythm ~60-70s, occasional PVCs. Pt feeling well. Denies SOB/chest pain/palpitations.     MEDICATIONS:  apixaban 5 milliGRAM(s) Oral every 12 hours  metoprolol succinate ER 50 milliGRAM(s) Oral daily  acetaminophen     Tablet .. 650 milliGRAM(s) Oral every 6 hours PRN  melatonin 3 milliGRAM(s) Oral at bedtime PRN  ondansetron Injectable 4 milliGRAM(s) IV Push every 8 hours PRN  aluminum hydroxide/magnesium hydroxide/simethicone Suspension 30 milliLiter(s) Oral every 4 hours PRN  pantoprazole    Tablet 40 milliGRAM(s) Oral before breakfast  atorvastatin 40 milliGRAM(s) Oral at bedtime    REVIEW OF SYSTEMS:  Complete 10point ROS negative.    PHYSICAL EXAM:  T(C): 36.6 (11-30-21 @ 13:10), Max: 36.7 (11-30-21 @ 05:22)  HR: 58 (11-30-21 @ 14:40) (51 - 146)  BP: 95/52 (11-30-21 @ 14:10) (90/49 - 122/66)  RR: 17 (11-30-21 @ 14:10) (14 - 20)  SpO2: 97% (11-30-21 @ 14:40) (96% - 100%)  Wt(kg): --  I&O's Summary      Appearance: Normal	  Cardiovascular: Normal S1 S2, No JVD, No murmurs  Respiratory: Lungs clear to auscultation	  Psychiatry: A & O x 3, Mood & affect appropriate	  Neurologic: Non-focal  Extremities: No BLE edema        LABS:	 	    CBC Full  -  ( 29 Nov 2021 22:22 )  WBC Count : 18.55 K/uL  Hemoglobin : 14.8 g/dL  Hematocrit : 45.1 %  Platelet Count - Automated : 307 K/uL  Mean Cell Volume : 92.0 fl  Mean Cell Hemoglobin : 30.2 pg  Mean Cell Hemoglobin Concentration : 32.8 gm/dL  Auto Neutrophil # : 15.95 K/uL  Auto Lymphocyte # : 1.18 K/uL  Auto Monocyte # : 1.16 K/uL  Auto Eosinophil # : 0.08 K/uL  Auto Basophil # : 0.02 K/uL  Auto Neutrophil % : 85.9 %  Auto Lymphocyte % : 6.4 %  Auto Monocyte % : 6.3 %  Auto Eosinophil % : 0.4 %  Auto Basophil % : 0.1 %    11-29    141  |  103  |  30<H>  ----------------------------<  139<H>  4.6   |  25  |  1.09    Ca    9.4      29 Nov 2021 22:22  Phos  4.1     11-29  Mg     2.4     11-29    TPro  6.5  /  Alb  4.3  /  TBili  0.3  /  DBili  x   /  AST  12  /  ALT  23  /  AlkPhos  73  11-29    TSH: Thyroid Stimulating Hormone, Serum in AM (11.30.21 @ 09:06)    Thyroid Stimulating Hormone, Serum: 1.10 uIU/mL    CARDIAC MARKERS: Troponin T, High Sensitivity (11.29.21 @ 22:22)    Troponin T, High Sensitivity Result: 23:       TELEMETRY: 	SR ~60-70s, occasional PVCs      PREVIOUS DIAGNOSTIC TESTING:    [ ] Echocardiogram: < from: Transesophageal Echocardiogram w/o TTE (11.30.21 @ 14:35) >  Dimensions:    Normal Values:  LA:            2.0 - 4.0 cm  Ao:            2.0 - 3.8 cm  SEPTUM:        0.6 - 1.2 cm  PWT:           0.6 - 1.1 cm  LVIDd:         3.0 - 5.6 cm  LVIDs:         1.8 - 4.0 cm  EF (Visual Estimate): 30 %  ------------------------------------------------------------------------  Observations:  Mitral Valve: Normal mitral valve. Mild mitral  regurgitation.  Aortic Valve/Aorta: Normal trileaflet aortic valve.  LVOT diameter: 2.4 cm.  Left Atrium: No left atrial or left atrial appendage  thrombus.   Normal left atrial appendage function  (velocity= 0.45 m/s).  Left Ventricle: Severe global left ventricular systolic  dysfunction. Left ventricular enlargement.  Right Heart: Normal right atrium. Recreased right  ventricular systolic function. Normal tricuspid valve. Mild  tricuspid regurgitation. Normal pulmonic valve.  Pericardium/Pleura: Normal pericardium with nopericardial  effusion.  ------------------------------------------------------------------------  Conclusions:  1. No left atrial or left atrial appendage thrombus.  Normal left atrial appendage function (velocity= 0.45 m/s).  2. Left ventricular enlargement.  3. Severe global left ventricular systolic dysfunction.  4. Recreased right ventricular systolic function.  GIANFRANCO performed in the setting of atrial flutter with rapid  ventricular response ( bpm).  *** No previous Echo exam.    < end of copied text >

## 2021-11-30 NOTE — ASU DISCHARGE PLAN (ADULT/PEDIATRIC) - CARE PROVIDER_API CALL
Jesus Alberto Nielsen)  Cardiac Electrophysiology; Cardiology  [address] [place] [address]  Phone: [phone]  Fax: [phone]  Follow Up Time:

## 2021-11-30 NOTE — PRE-ANESTHESIA EVALUATION ADULT - NSANTHPMHFT_GEN_ALL_CORE
Denies any history of CVA or MI. No acute distress. Able to climb 2 flights of stairs without complications Denies any history of CVA or MI. No acute distress. Able to climb 2 flights of stairs without complications  Asthma: well controlled, no ER visits or intubation

## 2021-11-30 NOTE — ASU PATIENT PROFILE, ADULT - FALL HARM RISK - UNIVERSAL INTERVENTIONS
Bed in lowest position, wheels locked, appropriate side rails in place/Call bell, personal items and telephone in reach/Instruct patient to call for assistance before getting out of bed or chair/Non-slip footwear when patient is out of bed/[place] to call system/Physically safe environment - no spills, clutter or unnecessary equipment/Purposeful Proactive Rounding/Room/bathroom lighting operational, light cord in reach

## 2021-11-30 NOTE — PROGRESS NOTE ADULT - ASSESSMENT
67 yo M with hx of HLD presented for DCCV for aflutter. Aflutter was thought to be 2/2 recent bronchitis vs albuterol use.    #Aflutter  - now rate controlled, pt was never symptomatic when he was in RVR. Pt is s/p GIANFRANCO & AFL ablation with Dr. Nielsen. Now in SR ~60-70s.  - Eliquis to be restarted tonight. Bleeding precautions reviewed with patient.  - BP soft ~/50-60s. Will start Metoprolol 25mg tomorrow AM  - Post-procedure instructions reviewed with patient, written instructions provided.   - Will schedule 4-6 week f/u with Dr. Nielsen.   Discussed with attending and primary team.

## 2021-11-30 NOTE — CONSULT NOTE ADULT - SUBJECTIVE AND OBJECTIVE BOX
Patient seen and evaluated at bedside    Chief Complaint: aflutter ablation    HPI:  67 yo M with a hx of HLD and recent bronchitis 2 weeks ago when he was using albuterol inhaler frequently and resulted in aflutter with RVR. He was minimally symptomatic denying chest pain, SOB, palpitations, dizziness. Etiology was thought to be 2/2 pulmonary disease vs albuterol use.         REVIEW OF SYSTEMS:  Constitutional:     [x ] negative [ ] fevers [ ] chills [ ] weight loss [ ] weight gain  HEENT:                  [x ] negative [ ] dry eyes [ ] eye irritation [ ] postnasal drip [ ] nasal congestion  CV:                         [ x] negative  [ ] chest pain [ ] orthopnea [ ] palpitations [ ] murmur  Resp:                     [x ] negative [ ] cough [ ] shortness of breath [ ] dyspnea [ ] wheezing [ ] sputum [ ]hemoptysis  GI:                          [ x] negative [ ] nausea [ ] vomiting [ ] diarrhea [ ] constipation [ ] abd pain [ ] dysphagia   :                        [ x] negative [ ] dysuria [ ] nocturia [ ] hematuria [ ] increased urinary frequency  Musculoskeletal: [x ] negative [ ] back pain [ ] myalgias [ ] arthralgias [ ] fracture  Skin:                       [ x] negative [ ] rash [ ] itch  Neurological:        [ x] negative [ ] headache [ ] dizziness [ ] syncope [ ] weakness [ ] numbness  Psychiatric:           [ x] negative [ ] anxiety [ ] depression  Endocrine:            [ x] negative [ ] diabetes [ ] thyroid problem  Heme/Lymph:      [ x] negative [ ] anemia [ ] bleeding problem  Allergic/Immune: [ x] negative [ ] itchy eyes [ ] nasal discharge [ ] hives [ ] angioedema    [ x] All other systems negative  [ ] Unable to assess ROS due to      Physical Exam:  T(F): 97.8 (11-29), Max: 97.8 (11-29)  HR: 72 (11-29) (72 - 146)  BP: 114/78 (11-29) (114/78 - 116/61)  RR: 18 (11-29)  SpO2: 96% (11-29)  GENERAL: No acute distress, well-developed  HEAD:  Atraumatic, Normocephalic  ENT: EOMI, PERRLA, conjunctiva and sclera clear, Neck supple, No JVD, moist mucosa  CHEST/LUNG: Clear to auscultation bilaterally; No wheeze, equal breath sounds bilaterally   BACK: No spinal tenderness  HEART: Regular rate and rhythm; No murmurs, rubs, or gallops  ABDOMEN: Soft, Nontender, Nondistended; Bowel sounds present  EXTREMITIES:  No clubbing, cyanosis, or edema  PSYCH: Nl behavior, nl affect  NEUROLOGY: AAOx3, non-focal, cranial nerves intact  SKIN: Normal color, No rashes or lesions  LINES:    Cardiovascular Diagnostic Testing:    ECG: Personally reviewed:    TTE: nl in 2016    Imaging:    CXR: Personally reviewed    Labs: Personally reviewed                        14.8   18.55 )-----------( 307      ( 29 Nov 2021 22:22 )             45.1     11-29    141  |  103  |  30<H>  ----------------------------<  139<H>  4.6   |  25  |  1.09    Ca    9.4      29 Nov 2021 22:22  Phos  4.1     11-29  Mg     2.4     11-29    TPro  6.5  /  Alb  4.3  /  TBili  0.3  /  DBili  x   /  AST  12  /  ALT  23  /  AlkPhos  73  11-29    PT/INR - ( 29 Nov 2021 22:22 )   PT: 13.9 sec;   INR: 1.17 ratio         PTT - ( 29 Nov 2021 22:22 )  PTT:32.5 sec         Patient seen and evaluated at bedside    Chief Complaint: aflutter ablation    HPI:  65 yo M with a hx of HLD and recent bronchitis 2 weeks ago when he was using albuterol inhaler frequently and resulted in aflutter with RVR. He was minimally symptomatic denying chest pain, SOB, palpitations, dizziness. Etiology was thought to be 2/2 pulmonary disease vs albuterol use. He was started on diltiazem 180 daily and Eliquis for stroke risk reduction. He was also started on prednisone for his bronchitis, which is now much more improved.     He presents today for planned GIANFRANCO/DCCV tomorrow with Dr. Tavares.     In the ED, his HR was in the 140s, he was asymptomatic. He was given 20 of IV dilt and he is now in aflutter with 4:1 block, HR of 70s.    REVIEW OF SYSTEMS:  Constitutional:     [x ] negative [ ] fevers [ ] chills [ ] weight loss [ ] weight gain  HEENT:                  [x ] negative [ ] dry eyes [ ] eye irritation [ ] postnasal drip [ ] nasal congestion  CV:                         [ x] negative  [ ] chest pain [ ] orthopnea [ ] palpitations [ ] murmur  Resp:                     [x ] negative [ ] cough [ ] shortness of breath [ ] dyspnea [ ] wheezing [ ] sputum [ ]hemoptysis  GI:                          [ x] negative [ ] nausea [ ] vomiting [ ] diarrhea [ ] constipation [ ] abd pain [ ] dysphagia   :                        [ x] negative [ ] dysuria [ ] nocturia [ ] hematuria [ ] increased urinary frequency  Musculoskeletal: [x ] negative [ ] back pain [ ] myalgias [ ] arthralgias [ ] fracture  Skin:                       [ x] negative [ ] rash [ ] itch  Neurological:        [ x] negative [ ] headache [ ] dizziness [ ] syncope [ ] weakness [ ] numbness  Psychiatric:           [ x] negative [ ] anxiety [ ] depression  Endocrine:            [ x] negative [ ] diabetes [ ] thyroid problem  Heme/Lymph:      [ x] negative [ ] anemia [ ] bleeding problem  Allergic/Immune: [ x] negative [ ] itchy eyes [ ] nasal discharge [ ] hives [ ] angioedema    [ x] All other systems negative  [ ] Unable to assess ROS due to      Physical Exam:  T(F): 97.8 (11-29), Max: 97.8 (11-29)  HR: 72 (11-29) (72 - 146)  BP: 114/78 (11-29) (114/78 - 116/61)  RR: 18 (11-29)  SpO2: 96% (11-29)  GENERAL: No acute distress, well-developed  HEAD:  Atraumatic, Normocephalic  ENT: EOMI, PERRLA, conjunctiva and sclera clear, Neck supple, No JVD, moist mucosa  CHEST/LUNG: Clear to auscultation bilaterally; No wheeze, equal breath sounds bilaterally   BACK: No spinal tenderness  HEART: Regular rate and rhythm; No murmurs, rubs, or gallops  ABDOMEN: Soft, Nontender, Nondistended; Bowel sounds present  EXTREMITIES:  No clubbing, cyanosis, or edema  PSYCH: Nl behavior, nl affect  NEUROLOGY: AAOx3, non-focal, cranial nerves intact  SKIN: Normal color, No rashes or lesions  LINES:    Cardiovascular Diagnostic Testing:    ECG: Personally reviewed:    TTE: nl in 2016    Imaging:    CXR: Personally reviewed    Labs: Personally reviewed                        14.8   18.55 )-----------( 307      ( 29 Nov 2021 22:22 )             45.1     11-29    141  |  103  |  30<H>  ----------------------------<  139<H>  4.6   |  25  |  1.09    Ca    9.4      29 Nov 2021 22:22  Phos  4.1     11-29  Mg     2.4     11-29    TPro  6.5  /  Alb  4.3  /  TBili  0.3  /  DBili  x   /  AST  12  /  ALT  23  /  AlkPhos  73  11-29    PT/INR - ( 29 Nov 2021 22:22 )   PT: 13.9 sec;   INR: 1.17 ratio         PTT - ( 29 Nov 2021 22:22 )  PTT:32.5 sec

## 2021-11-30 NOTE — H&P ADULT - NSHPPHYSICALEXAM_GEN_ALL_CORE
Vital Signs Last 24 Hrs  T(C): 36.6 (30 Nov 2021 02:00), Max: 36.6 (29 Nov 2021 21:05)  T(F): 97.8 (30 Nov 2021 02:00), Max: 97.8 (29 Nov 2021 21:05)  HR: 79 (30 Nov 2021 02:00) (72 - 146)  BP: 108/75 (30 Nov 2021 02:00) (108/75 - 116/61)  BP(mean): --  RR: 18 (30 Nov 2021 02:00) (18 - 20)  SpO2: 100% (30 Nov 2021 02:00) (96% - 100%)

## 2021-11-30 NOTE — H&P ADULT - PROBLEM SELECTOR PLAN 1
-s/p Diltiazem IV in ED  -rate now controlled in the 70s  -tele monitoring  -NPO for electrical cardioversion  -c/w eliquis 10mg BID for stroke ppx  -monitor electrolytes and replete prn  -f/u TSH -s/p Diltiazem 20mg IV in ED  -rate now controlled in the 70s  -tele monitoring  -will start on Diltiazem 180mg ER for now; can be inc as needed for elevated HR  -NPO for electrical cardioversion  -c/w eliquis 5 mg BID for stroke ppx  -monitor electrolytes and replete prn  -f/u TSH

## 2021-11-30 NOTE — ASU DISCHARGE PLAN (ADULT/PEDIATRIC) - NS MD DC FALL RISK RISK
For information on Fall & Injury Prevention, visit: [email or web address] OR  [email or web address] OR  https://www.cdc.gov/steadi/patient.html

## 2021-11-30 NOTE — CONSULT NOTE ADULT - ASSESSMENT
67 yo M with hx of HLD presented for DCCV for aflutter with Dr. Tavares. Aflutter was thought to be 2/2 recent bronchitis vs albuterol use.    #Aflutter  - now rate controlled, pt was never symptomatic when he was in RVR  - c/w home diltiazem PO, s/p 20 IV dilt  - c/w home Eliquis  - NPO after MN  - telemetry  - K > 4, Mg > 2

## 2021-11-30 NOTE — H&P ADULT - HISTORY OF PRESENT ILLNESS
66M PMH HLD  presenting with uncontrolled HR. He reports having bronchitis 2-weeks ago for which he took an albuterol inhaler and saw his outpatient PCP who did an EKG and incidentally found pt to be in AFlutter. He was referred to his cardiologist(Dr.Lisker) who started him on Diltiazem and Eliquis on 11/19. He had a follow up visit where he was noted to still have an elevated HR and his dilt dosage was inc to 180mg BID. He then presented again for re-evaluation 11/29 and was still found to have an elevated HR to the 140s and advised to come in for electrical cardioversion. He reports that he has been asymptomatic denying ever having palpitations, CP, SOB, dizziness. Reports episode of bronchitis has since resolved.     ED course:HDS HR-. s/p Dilt 20 IV 66M PMH HLD  presenting with uncontrolled HR. He reports having bronchitis 2-weeks ago for which he took an albuterol inhaler and saw his outpatient PCP who did an EKG and incidentally found pt to be in AFlutter. He was referred to his cardiologist(Dr.Lisker) who started him on Diltiazem and Eliquis on 11/19. He had a follow up visit where he was noted to still have an elevated HR and his dilt dosage was inc to 180mg BID. He then presented again for re-evaluation 11/29 and was still found to have an elevated HR to the 140s and advised to come in for electrical cardioversion. He reports that he has been asymptomatic denying ever having palpitations, CP, SOB, dizziness. Reports episode of bronchitis has since resolved.     ED course:HDS HR-70s. s/p Dilt 20 IV

## 2021-11-30 NOTE — CHART NOTE - NSCHARTNOTEFT_GEN_A_CORE
ARTHUR GIBSON  [id number]    PROCEDURE:  Atrial flutter ablation    INDICATION:  Typical atrial flutter    ELECTROPHYSIOLOGIST(S):  MD Prashanth Brown MD (fellow)    ANESTHESIOLOGY:  GA    FINDINGS:  Successful ablation of typical atrial flutter    COMPLICATIONS:  None    RECOMMENDATIONS:  Metoprolol succinate 50mg daily  Stop Diltiazem  Resume eliquis tonight  Bedrest 3 hours  discharge home tonight
As discussed with EP TEAM:    Toprol xl 25 mg po to start in am  Eliquis to be dosed this PM  Discontinue diltiazem  Plan discussed with JESSE Rodriguez.    Judah Eastman, NP  x [phone]
Patient seen by cards- cardioversion today  dc diltiazem and start metoprolol XL 50 mg daily, per cards  wbc elevated, repeat CBC with diff pending    Tiffanie Painting D.O.  Hospitalist- available on MS teams
fall

## 2021-12-01 PROBLEM — E78.5 HYPERLIPIDEMIA, UNSPECIFIED: Chronic | Status: ACTIVE | Noted: 2021-11-30

## 2021-12-01 PROBLEM — I48.91 UNSPECIFIED ATRIAL FIBRILLATION: Chronic | Status: ACTIVE | Noted: 2021-11-30

## 2021-12-02 DIAGNOSIS — J40 BRONCHITIS, NOT SPECIFIED AS ACUTE OR CHRONIC: ICD-10-CM

## 2021-12-02 RX ORDER — PREDNISONE 20 MG/1
20 TABLET ORAL TWICE DAILY
Qty: 20 | Refills: 0 | Status: DISCONTINUED | COMMUNITY
Start: 2021-11-22 | End: 2021-12-02

## 2021-12-02 RX ORDER — DILTIAZEM HYDROCHLORIDE 180 MG/1
180 CAPSULE, EXTENDED RELEASE ORAL DAILY
Qty: 30 | Refills: 1 | Status: DISCONTINUED | COMMUNITY
Start: 2021-11-22 | End: 2021-12-02

## 2021-12-14 ENCOUNTER — APPOINTMENT (OUTPATIENT)
Dept: CARDIOLOGY | Facility: CLINIC | Age: 66
End: 2021-12-14
Payer: MEDICARE

## 2021-12-14 ENCOUNTER — NON-APPOINTMENT (OUTPATIENT)
Age: 66
End: 2021-12-14

## 2021-12-14 VITALS
SYSTOLIC BLOOD PRESSURE: 118 MMHG | WEIGHT: 164 LBS | OXYGEN SATURATION: 100 % | DIASTOLIC BLOOD PRESSURE: 68 MMHG | HEART RATE: 52 BPM | BODY MASS INDEX: 24.57 KG/M2

## 2021-12-14 DIAGNOSIS — R07.9 CHEST PAIN, UNSPECIFIED: ICD-10-CM

## 2021-12-14 PROCEDURE — 99214 OFFICE O/P EST MOD 30 MIN: CPT

## 2021-12-14 PROCEDURE — 93000 ELECTROCARDIOGRAM COMPLETE: CPT

## 2021-12-14 NOTE — HISTORY OF PRESENT ILLNESS
[FreeTextEntry1] : He underwent c/v\par left lateral chest/back discomfort since sunday. Some vague twinge. Tender to touch.\par No orthopnea or PND.\par His dyspnea and overal feelings have not changed.\par \par d/c summary reviewed:\par \par Assessment and Plan:\par - Assessment \par 65 yo M with hx of HLD presented for DCCV for aflutter. Aflutter was thought to\par be 2/2 recent bronchitis vs albuterol use.\par \par #Aflutter\par - now rate controlled, pt was never symptomatic when he was in RVR. Pt is s/p\par GIANFRANCO & AFL ablation with Dr. Nielsen. Now in SR ~60-70s.\par - Eliquis to be restarted tonight. Bleeding precautions reviewed with patient.\par - BP soft ~/50-60s. Will start Metoprolol 25mg tomorrow AM\par - Post-procedure instructions reviewed with patient, written instructions\par provided.\par - Will schedule 4-6 week f/u with Dr. Nielsen.\par Discussed with attending and primary team.\par \par \par \par Prior:\par He is taking his medications as directed.\par Feels okay. No chest pain.\par No dizziness.\par Primatine mist use last week was noted.\par \par Prior:\par Dear Evan,\par Thank you for referring him for cardiovascular evaluation and management. He is a 66-year-old with a history of hyperlipidemia who presented your office with 2 weeks of wheezing. He was noted to be in a tachyarrhythmia and referred to my office.\par He describes wheezing over the past few weeks. He was treating these wheezing episodes with an albuterol inhaler fairly frequently but did not seek medical attention until today. He denies any lightheadedness, dizziness or syncope or history of arrhythmia.\par He has no chest pains or shortness of breath with exertion has been able to go about his usual activities without difficulty.\par He has no history of coronary artery disease, diabetes mellitus, stroke, renal insufficiency, smoking but does have a family history of coronary disease as his father had bypass in his 60s.\par

## 2021-12-14 NOTE — DISCUSSION/SUMMARY
[FreeTextEntry1] : He is a 66-year-old with hypercholesterolemia who presents with minimally symptomatic atrial flutter with a persistent rapid ventricular response now status post atrial flutter ablation with resumption and maintenance of sinus rhythm.\par His chest pain is nonanginal or pericardial.\par I reassured him.\par ECG shows sinus bradycardia with a right bundle branch block morphology.\par Continue low-dose metoprolol and NOAC for the time being.\par I scheduled an echo to monitor his ejection fraction which I expect to improve over time.\par Follow-up chest x-ray to compare with prior.

## 2021-12-14 NOTE — PHYSICAL EXAM
[Well Developed] : well developed [Well Nourished] : well nourished [No Acute Distress] : no acute distress [Normal Conjunctiva] : normal conjunctiva [Normal Venous Pressure] : normal venous pressure [No Carotid Bruit] : no carotid bruit [Normal S1, S2] : normal S1, S2 [No Murmur] : no murmur [No Rub] : no rub [No Gallop] : no gallop [Soft] : abdomen soft [Non Tender] : non-tender [No Masses/organomegaly] : no masses/organomegaly [Normal Bowel Sounds] : normal bowel sounds [Normal Gait] : normal gait [No Edema] : no edema [No Cyanosis] : no cyanosis [No Clubbing] : no clubbing [No Varicosities] : no varicosities [No Rash] : no rash [No Skin Lesions] : no skin lesions [Moves all extremities] : moves all extremities [No Focal Deficits] : no focal deficits [Normal Speech] : normal speech [Alert and Oriented] : alert and oriented [Normal memory] : normal memory [de-identified] : Tachycardic [de-identified] : Decreased breath sounds throughout with faint expiratory wheeze left greater than right.

## 2021-12-14 NOTE — CARDIOLOGY SUMMARY
[de-identified] : PREVIOUS DIAGNOSTIC TESTING:\par [ ] Echocardiogram: < from: Transesophageal Echocardiogram w/o TTE (11.30.21 @\par 14:35) >\par Dimensions: Normal Values:\par LA: 2.0 - 4.0 cm\par Ao: 2.0 - 3.8 cm\par SEPTUM: 0.6 - 1.2 cm\par PWT: 0.6 - 1.1 cm\par LVIDd: 3.0 - 5.6 cm\par LVIDs: 1.8 - 4.0 cm\par EF (Visual Estimate): 30 %\par ------------------------------------------------------------------------\par Observations:\par Mitral Valve: Normal mitral valve. Mild mitral\par regurgitation.\par Aortic Valve/Aorta: Normal trileaflet aortic valve.\par LVOT diameter: 2.4 cm.\par Left Atrium: No left atrial or left atrial appendage\par thrombus. Normal left atrial appendage function\par (velocity= 0.45 m/s).\par Left Ventricle: Severe global left ventricular systolic\par dysfunction. Left ventricular enlargement.\par Right Heart: Normal right atrium. Recreased right\par ventricular systolic function. Normal tricuspid valve. Mild\par tricuspid regurgitation. Normal pulmonic valve.\par Pericardium/Pleura: Normal pericardium with nopericardial\par effusion.\par ------------------------------------------------------------------------\par Conclusions:\par 1. No left atrial or left atrial appendage thrombus.\par Normal left atrial appendage function (velocity= 0.45 m/s).\par 2. Left ventricular enlargement.\par 3. Severe global left ventricular systolic dysfunction.\par 4. Recreased right ventricular systolic function.\par GIANFRANCO performed in the setting of atrial flutter with rapid\par ventricular response ( bpm).\par *** No previous Echo exam.\par \par < end of copied text >\par \par \par \par \par Assessment and Plan:\par - Assessment \par 65 yo M with hx of HLD presented for DCCV for aflutter. Aflutter was thought to\par be 2/2 recent bronchitis vs albuterol use.\par \par #Aflutter\par - now rate controlled, pt was never symptomatic when he was in RVR. Pt is s/p\par GIANFRANCO & AFL ablation with Dr. Nielsen. Now in SR ~60-70s.\par - Eliquis to be restarted tonight. Bleeding precautions reviewed with patient.\par - BP soft ~/50-60s. Will start Metoprolol 25mg tomorrow AM\par - Post-procedure instructions reviewed with patient, written instructions\par provided.\par - Will schedule 4-6 week f/u with Dr. Nielsen.\par Discussed with attending and primary team.\par \par \par

## 2021-12-15 ENCOUNTER — APPOINTMENT (OUTPATIENT)
Dept: CARDIOLOGY | Facility: CLINIC | Age: 66
End: 2021-12-15
Payer: MEDICARE

## 2021-12-15 PROCEDURE — 93306 TTE W/DOPPLER COMPLETE: CPT

## 2021-12-30 PROCEDURE — 99285 EMERGENCY DEPT VISIT HI MDM: CPT | Mod: 25

## 2021-12-30 PROCEDURE — 86850 RBC ANTIBODY SCREEN: CPT

## 2021-12-30 PROCEDURE — C1730: CPT

## 2021-12-30 PROCEDURE — 84100 ASSAY OF PHOSPHORUS: CPT

## 2021-12-30 PROCEDURE — 84443 ASSAY THYROID STIM HORMONE: CPT

## 2021-12-30 PROCEDURE — 80053 COMPREHEN METABOLIC PANEL: CPT

## 2021-12-30 PROCEDURE — 93320 DOPPLER ECHO COMPLETE: CPT

## 2021-12-30 PROCEDURE — 93312 ECHO TRANSESOPHAGEAL: CPT

## 2021-12-30 PROCEDURE — 93623 PRGRMD STIMJ&PACG IV RX NFS: CPT

## 2021-12-30 PROCEDURE — 84484 ASSAY OF TROPONIN QUANT: CPT

## 2021-12-30 PROCEDURE — 83735 ASSAY OF MAGNESIUM: CPT

## 2021-12-30 PROCEDURE — C1760: CPT

## 2021-12-30 PROCEDURE — U0005: CPT

## 2021-12-30 PROCEDURE — 93325 DOPPLER ECHO COLOR FLOW MAPG: CPT

## 2021-12-30 PROCEDURE — C1732: CPT

## 2021-12-30 PROCEDURE — 86900 BLOOD TYPING SEROLOGIC ABO: CPT

## 2021-12-30 PROCEDURE — 93005 ELECTROCARDIOGRAM TRACING: CPT

## 2021-12-30 PROCEDURE — 93653 COMPRE EP EVAL TX SVT: CPT

## 2021-12-30 PROCEDURE — C1894: CPT

## 2021-12-30 PROCEDURE — U0003: CPT

## 2021-12-30 PROCEDURE — C1766: CPT

## 2021-12-30 PROCEDURE — 85025 COMPLETE CBC W/AUTO DIFF WBC: CPT

## 2021-12-30 PROCEDURE — 96374 THER/PROPH/DIAG INJ IV PUSH: CPT

## 2021-12-30 PROCEDURE — 85730 THROMBOPLASTIN TIME PARTIAL: CPT

## 2021-12-30 PROCEDURE — 85610 PROTHROMBIN TIME: CPT

## 2021-12-30 PROCEDURE — 36415 COLL VENOUS BLD VENIPUNCTURE: CPT

## 2021-12-30 PROCEDURE — 93613 INTRACARDIAC EPHYS 3D MAPG: CPT

## 2021-12-30 PROCEDURE — 86901 BLOOD TYPING SEROLOGIC RH(D): CPT

## 2022-01-05 ENCOUNTER — APPOINTMENT (OUTPATIENT)
Dept: ELECTROPHYSIOLOGY | Facility: CLINIC | Age: 67
End: 2022-01-05
Payer: MEDICARE

## 2022-01-05 VITALS
HEIGHT: 68.5 IN | WEIGHT: 164 LBS | BODY MASS INDEX: 24.57 KG/M2 | OXYGEN SATURATION: 99 % | HEART RATE: 48 BPM | DIASTOLIC BLOOD PRESSURE: 74 MMHG | SYSTOLIC BLOOD PRESSURE: 124 MMHG

## 2022-01-05 PROCEDURE — 99214 OFFICE O/P EST MOD 30 MIN: CPT

## 2022-01-05 PROCEDURE — 93000 ELECTROCARDIOGRAM COMPLETE: CPT

## 2022-01-05 RX ORDER — PANTOPRAZOLE 40 MG/1
40 TABLET, DELAYED RELEASE ORAL DAILY
Qty: 90 | Refills: 3 | Status: DISCONTINUED | COMMUNITY
End: 2022-01-05

## 2022-01-05 NOTE — HISTORY OF PRESENT ILLNESS
[FreeTextEntry1] : I had the pleasure of seeing Geremias Olea today in the arrhythmia clinic at [facility]. As you well know, he is a pleasant 66 year old gentleman with a history of bronchitis, hyperlipidemia and recently diagnosed typical atrial flutter in which he underwent a CTI ablation on 11/30/2021. At the time he presented in atrial fluter with rapid ventricular rates, his echocardiogram demonstrated an EF of 30%. Repeat Echocardiogram in December has shown an improvement in his LV function to 44%. His ECG today shows sinus bradycardia at 48 bpm but he denies any chest pain, palpitations, near syncope or syncope. He reports occasional fatigue. He is on low dose metoprolol 25mg daily.\par

## 2022-01-05 NOTE — DISCUSSION/SUMMARY
[FreeTextEntry1] : In summary, Mr. Olea is a 66 year old gentleman with history of hyperlipidemia, bronchitis and atrial flutter who underwent an CTI ablation in November 2021. He had developed LV dysfunction in the setting of atrial flutter which has since improved on recent Echocardiogram. We recommend to continue Eliquis and low dose metoprolol. An Echocardiogram should be repeated in 3 months. We will also arrange for a 2 week Ziopatch to be mailed to patient's house in 2 months to assess for any AF. If his Ziopatch shows no AF, we are comfortable discontinuing his Eliquis. If his repeat Echocardiogram in 3 months shows normalization of his LV function we are also comfortable with discontinuing his beta-blockers. He will call us a week after he mails his Ziopatch to review the results and discuss further management plans.

## 2022-01-05 NOTE — PHYSICAL EXAM
[Well Developed] : well developed [Well Nourished] : well nourished [No Acute Distress] : no acute distress [Normal Conjunctiva] : normal conjunctiva [Normal Venous Pressure] : normal venous pressure [Normal S1, S2] : normal S1, S2 [No Murmur] : no murmur [No Rub] : no rub [No Gallop] : no gallop [Clear Lung Fields] : clear lung fields [Good Air Entry] : good air entry [No Respiratory Distress] : no respiratory distress  [Soft] : abdomen soft [Non Tender] : non-tender [Normal Bowel Sounds] : normal bowel sounds [Normal Gait] : normal gait [No Edema] : no edema [No Cyanosis] : no cyanosis [No Rash] : no rash [Moves all extremities] : moves all extremities [No Focal Deficits] : no focal deficits [Normal Speech] : normal speech [Alert and Oriented] : alert and oriented [Normal memory] : normal memory

## 2022-01-10 ENCOUNTER — APPOINTMENT (OUTPATIENT)
Dept: ELECTROPHYSIOLOGY | Facility: CLINIC | Age: 67
End: 2022-01-10
Payer: MEDICARE

## 2022-01-10 PROCEDURE — 93227 XTRNL ECG REC<48 HR R&I: CPT | Mod: NC

## 2022-02-03 ENCOUNTER — APPOINTMENT (OUTPATIENT)
Dept: CARDIOLOGY | Facility: CLINIC | Age: 67
End: 2022-02-03
Payer: MEDICARE

## 2022-02-03 ENCOUNTER — NON-APPOINTMENT (OUTPATIENT)
Age: 67
End: 2022-02-03

## 2022-02-03 VITALS
WEIGHT: 168 LBS | HEART RATE: 51 BPM | SYSTOLIC BLOOD PRESSURE: 90 MMHG | DIASTOLIC BLOOD PRESSURE: 60 MMHG | BODY MASS INDEX: 25.17 KG/M2 | OXYGEN SATURATION: 97 %

## 2022-02-03 PROCEDURE — 93000 ELECTROCARDIOGRAM COMPLETE: CPT

## 2022-02-03 PROCEDURE — 99214 OFFICE O/P EST MOD 30 MIN: CPT

## 2022-02-03 NOTE — PHYSICAL EXAM
[Well Developed] : well developed [Well Nourished] : well nourished [No Acute Distress] : no acute distress [Normal Conjunctiva] : normal conjunctiva [Normal Venous Pressure] : normal venous pressure [No Carotid Bruit] : no carotid bruit [Normal S1, S2] : normal S1, S2 [No Murmur] : no murmur [No Rub] : no rub [No Gallop] : no gallop [Soft] : abdomen soft [Non Tender] : non-tender [No Masses/organomegaly] : no masses/organomegaly [Normal Bowel Sounds] : normal bowel sounds [Normal Gait] : normal gait [No Edema] : no edema [No Cyanosis] : no cyanosis [No Clubbing] : no clubbing [No Varicosities] : no varicosities [No Rash] : no rash [No Skin Lesions] : no skin lesions [Moves all extremities] : moves all extremities [No Focal Deficits] : no focal deficits [Normal Speech] : normal speech [Alert and Oriented] : alert and oriented [Normal memory] : normal memory [de-identified] : Tachycardic [de-identified] : Decreased breath sounds throughout with faint expiratory wheeze left greater than right.

## 2022-02-03 NOTE — CARDIOLOGY SUMMARY
[de-identified] : PREVIOUS DIAGNOSTIC TESTING:\par [ ] Echocardiogram: < from: Transesophageal Echocardiogram w/o TTE (11.30.21 @\par 14:35) >\par Dimensions: Normal Values:\par LA: 2.0 - 4.0 cm\par Ao: 2.0 - 3.8 cm\par SEPTUM: 0.6 - 1.2 cm\par PWT: 0.6 - 1.1 cm\par LVIDd: 3.0 - 5.6 cm\par LVIDs: 1.8 - 4.0 cm\par EF (Visual Estimate): 30 %\par ------------------------------------------------------------------------\par Observations:\par Mitral Valve: Normal mitral valve. Mild mitral\par regurgitation.\par Aortic Valve/Aorta: Normal trileaflet aortic valve.\par LVOT diameter: 2.4 cm.\par Left Atrium: No left atrial or left atrial appendage\par thrombus. Normal left atrial appendage function\par (velocity= 0.45 m/s).\par Left Ventricle: Severe global left ventricular systolic\par dysfunction. Left ventricular enlargement.\par Right Heart: Normal right atrium. Recreased right\par ventricular systolic function. Normal tricuspid valve. Mild\par tricuspid regurgitation. Normal pulmonic valve.\par Pericardium/Pleura: Normal pericardium with nopericardial\par effusion.\par ------------------------------------------------------------------------\par Conclusions:\par 1. No left atrial or left atrial appendage thrombus.\par Normal left atrial appendage function (velocity= 0.45 m/s).\par 2. Left ventricular enlargement.\par 3. Severe global left ventricular systolic dysfunction.\par 4. Recreased right ventricular systolic function.\par GIANFRANCO performed in the setting of atrial flutter with rapid\par ventricular response ( bpm).\par *** No previous Echo exam.\par \par < end of copied text >\par \par \par \par \par Assessment and Plan:\par - Assessment \par 65 yo M with hx of HLD presented for DCCV for aflutter. Aflutter was thought to\par be 2/2 recent bronchitis vs albuterol use.\par \par #Aflutter\par - now rate controlled, pt was never symptomatic when he was in RVR. Pt is s/p\par GIANFRANCO & AFL ablation with Dr. Nielsen. Now in SR ~60-70s.\par - Eliquis to be restarted tonight. Bleeding precautions reviewed with patient.\par - BP soft ~/50-60s. Will start Metoprolol 25mg tomorrow AM\par - Post-procedure instructions reviewed with patient, written instructions\par provided.\par - Will schedule 4-6 week f/u with Dr. Nielsen.\par Discussed with attending and primary team.\par \par \par

## 2022-02-03 NOTE — DISCUSSION/SUMMARY
[FreeTextEntry1] : He is a 67-year-old with hypercholesterolemia with minimally symptomatic atrial flutter now s/p ablation in November 2021. \par He continues to be in sinus rhythm.\par Plan discussed with EP. Will obtain 2 week monitor. If no AFl then may d/c NOAC.\par Continue low dose beta-blocker.\par ECG unchanged, asymptomatic bradycardia.\par

## 2022-02-03 NOTE — HISTORY OF PRESENT ILLNESS
[FreeTextEntry1] : He has been feeling fine.\par Hasn't received the extended cardiac monitor.\par No palpitations or dizziness.\par No bleeding issues.\par EP note reviewed.\par \par Prior:\par He underwent c/v\par left lateral chest/back discomfort since sunday. Some vague twinge. Tender to touch.\par No orthopnea or PND.\par His dyspnea and overal feelings have not changed.\par \par d/c summary reviewed:\par \par Assessment and Plan:\par - Assessment \par 65 yo M with hx of HLD presented for DCCV for aflutter. Aflutter was thought to\par be 2/2 recent bronchitis vs albuterol use.\par \par #Aflutter\par - now rate controlled, pt was never symptomatic when he was in RVR. Pt is s/p\par GIANFRANCO & AFL ablation with Dr. Nielsen. Now in SR ~60-70s.\par - Eliquis to be restarted tonight. Bleeding precautions reviewed with patient.\par - BP soft ~/50-60s. Will start Metoprolol 25mg tomorrow AM\par - Post-procedure instructions reviewed with patient, written instructions\par provided.\par - Will schedule 4-6 week f/u with Dr. Nielsen.\par Discussed with attending and primary team.\par \par \par \par Prior:\par He is taking his medications as directed.\par Feels okay. No chest pain.\par No dizziness.\par Primatine mist use last week was noted.\par \par Prior:\par Dear Evan\par Thank you for referring him for cardiovascular evaluation and management. He is a 66-year-old with a history of hyperlipidemia who presented your office with 2 weeks of wheezing. He was noted to be in a tachyarrhythmia and referred to my office.\par He describes wheezing over the past few weeks. He was treating these wheezing episodes with an albuterol inhaler fairly frequently but did not seek medical attention until today. He denies any lightheadedness, dizziness or syncope or history of arrhythmia.\par He has no chest pains or shortness of breath with exertion has been able to go about his usual activities without difficulty.\par He has no history of coronary artery disease, diabetes mellitus, stroke, renal insufficiency, smoking but does have a family history of coronary disease as his father had bypass in his 60s.\par

## 2022-02-07 ENCOUNTER — NON-APPOINTMENT (OUTPATIENT)
Age: 67
End: 2022-02-07

## 2022-02-16 ENCOUNTER — LABORATORY RESULT (OUTPATIENT)
Age: 67
End: 2022-02-16

## 2022-02-16 ENCOUNTER — APPOINTMENT (OUTPATIENT)
Dept: INTERNAL MEDICINE | Facility: CLINIC | Age: 67
End: 2022-02-16
Payer: MEDICARE

## 2022-02-16 VITALS
DIASTOLIC BLOOD PRESSURE: 60 MMHG | HEIGHT: 68 IN | SYSTOLIC BLOOD PRESSURE: 90 MMHG | WEIGHT: 168 LBS | BODY MASS INDEX: 25.46 KG/M2

## 2022-02-16 DIAGNOSIS — L98.9 DISORDER OF THE SKIN AND SUBCUTANEOUS TISSUE, UNSPECIFIED: ICD-10-CM

## 2022-02-16 DIAGNOSIS — E66.3 OVERWEIGHT: ICD-10-CM

## 2022-02-16 PROCEDURE — 36415 COLL VENOUS BLD VENIPUNCTURE: CPT

## 2022-02-16 PROCEDURE — 99214 OFFICE O/P EST MOD 30 MIN: CPT | Mod: 25

## 2022-02-16 NOTE — HISTORY OF PRESENT ILLNESS
[FreeTextEntry1] : This is a 67-year-old male for evaluation and treatment of his atrial flutter hypertension reduced ejection fraction hypercholesterolemia and bronchospasm [de-identified] : Patient is feeling well he has noticed a small mass on his arm\par \par He has had no chest pain or shortness of breath

## 2022-02-16 NOTE — PHYSICAL EXAM
[Normal] : no respiratory distress, lungs were clear to auscultation bilaterally and no accessory muscle use [de-identified] : 3/6 systolic ejection murmur

## 2022-02-16 NOTE — ASSESSMENT
[FreeTextEntry1] : This is a 67-year-old male who had atrial flutter and cardiomyopathy probably rate related\par \par Patient currently is in sinus he feels well his ejection fraction is moving up.\par \par He has a history of hypercholesterolemia a cholesterol profile has been obtained recommendations pending results\par \par He had an elevated hemoglobin A1c his weight has dropped significantly this has been repeated\par \par In terms of his weight we did do a repeat CBC\par \par In addition he has had episodes of shortness of breath and wheezing probably due to cardiac sources we will obtain a chest x-ray\par \par He has a mass in his left arm which feels to be a lipoma we will obtain an ultrasound\par \par His blood pressure is low normal he has no orthostasis probably combination of cardiac weight and beta-blocker no intervention\par \par

## 2022-02-17 DIAGNOSIS — M79.89 OTHER SPECIFIED SOFT TISSUE DISORDERS: ICD-10-CM

## 2022-02-17 LAB
25(OH)D3 SERPL-MCNC: 24 NG/ML
ALBUMIN SERPL ELPH-MCNC: 4.5 G/DL
ALP BLD-CCNC: 70 U/L
ALT SERPL-CCNC: 27 U/L
ANION GAP SERPL CALC-SCNC: 14 MMOL/L
AST SERPL-CCNC: 27 U/L
BASOPHILS # BLD AUTO: 0.05 K/UL
BASOPHILS NFR BLD AUTO: 0.7 %
BILIRUB SERPL-MCNC: 0.6 MG/DL
BUN SERPL-MCNC: 21 MG/DL
CALCIUM SERPL-MCNC: 9.6 MG/DL
CHLORIDE SERPL-SCNC: 107 MMOL/L
CHOLEST SERPL-MCNC: 221 MG/DL
CO2 SERPL-SCNC: 26 MMOL/L
CREAT SERPL-MCNC: 1.25 MG/DL
EOSINOPHIL # BLD AUTO: 0.48 K/UL
EOSINOPHIL NFR BLD AUTO: 6.7 %
ESTIMATED AVERAGE GLUCOSE: 123 MG/DL
GLUCOSE SERPL-MCNC: 76 MG/DL
HBA1C MFR BLD HPLC: 5.9 %
HCT VFR BLD CALC: 42.8 %
HDLC SERPL-MCNC: 73 MG/DL
HGB BLD-MCNC: 13.9 G/DL
IMM GRANULOCYTES NFR BLD AUTO: 0.1 %
LDLC SERPL CALC-MCNC: 122 MG/DL
LYMPHOCYTES # BLD AUTO: 1.37 K/UL
LYMPHOCYTES NFR BLD AUTO: 19.2 %
MAN DIFF?: NORMAL
MCHC RBC-ENTMCNC: 30.7 PG
MCHC RBC-ENTMCNC: 32.5 GM/DL
MCV RBC AUTO: 94.5 FL
MONOCYTES # BLD AUTO: 0.71 K/UL
MONOCYTES NFR BLD AUTO: 10 %
NEUTROPHILS # BLD AUTO: 4.51 K/UL
NEUTROPHILS NFR BLD AUTO: 63.3 %
NONHDLC SERPL-MCNC: 148 MG/DL
PLATELET # BLD AUTO: 184 K/UL
POTASSIUM SERPL-SCNC: 5 MMOL/L
PROT SERPL-MCNC: 6 G/DL
RBC # BLD: 4.53 M/UL
RBC # FLD: 13.4 %
SODIUM SERPL-SCNC: 147 MMOL/L
T3RU NFR SERPL: 1.1 TBI
T4 SERPL-MCNC: 6 UG/DL
TRIGL SERPL-MCNC: 128 MG/DL
TSH SERPL-ACNC: 1.91 UIU/ML
URATE SERPL-MCNC: 5.7 MG/DL
WBC # FLD AUTO: 7.13 K/UL

## 2022-03-07 ENCOUNTER — RX RENEWAL (OUTPATIENT)
Age: 67
End: 2022-03-07

## 2022-05-05 ENCOUNTER — APPOINTMENT (OUTPATIENT)
Dept: CARDIOLOGY | Facility: CLINIC | Age: 67
End: 2022-05-05
Payer: MEDICARE

## 2022-05-05 ENCOUNTER — NON-APPOINTMENT (OUTPATIENT)
Age: 67
End: 2022-05-05

## 2022-05-05 VITALS
DIASTOLIC BLOOD PRESSURE: 66 MMHG | OXYGEN SATURATION: 96 % | HEART RATE: 50 BPM | SYSTOLIC BLOOD PRESSURE: 90 MMHG | WEIGHT: 167 LBS | BODY MASS INDEX: 25.39 KG/M2

## 2022-05-05 DIAGNOSIS — R00.1 BRADYCARDIA, UNSPECIFIED: ICD-10-CM

## 2022-05-05 PROCEDURE — 99214 OFFICE O/P EST MOD 30 MIN: CPT

## 2022-05-05 PROCEDURE — 93000 ELECTROCARDIOGRAM COMPLETE: CPT

## 2022-05-05 NOTE — DISCUSSION/SUMMARY
[FreeTextEntry1] : He is a 67-year-old with hypercholesterolemia with minimally symptomatic atrial flutter now s/p ablation in November 2021. \par He continues to be in sinus rhythm.  ECG shows sinus bradycardia with a right bundle branch block.\par Bradycardia: Asymptomatic and given tendency to have SVT I would consider continuing his metoprolol 25 mg daily.\par Paroxysmal atrial flutter: Status post ablation November 2021.  No sign of atrial fibrillation or flutter on 2-week cardiac monitor.  I agree with EP's evaluation that he does not require any further oral anticoagulation and he may discontinue this medicine as of now.  (Especially since his compliance with this medication is admittedly suboptimal)\par Echocardiography to monitor LV systolic function which hopefully will have improved.\par 6-month follow-up.\par

## 2022-05-05 NOTE — CARDIOLOGY SUMMARY
[de-identified] : PREVIOUS DIAGNOSTIC TESTING:\par [ ] Echocardiogram: < from: Transesophageal Echocardiogram w/o TTE (11.30.21 @\par 14:35) >\par Dimensions: Normal Values:\par LA: 2.0 - 4.0 cm\par Ao: 2.0 - 3.8 cm\par SEPTUM: 0.6 - 1.2 cm\par PWT: 0.6 - 1.1 cm\par LVIDd: 3.0 - 5.6 cm\par LVIDs: 1.8 - 4.0 cm\par EF (Visual Estimate): 30 %\par ------------------------------------------------------------------------\par Observations:\par Mitral Valve: Normal mitral valve. Mild mitral\par regurgitation.\par Aortic Valve/Aorta: Normal trileaflet aortic valve.\par LVOT diameter: 2.4 cm.\par Left Atrium: No left atrial or left atrial appendage\par thrombus. Normal left atrial appendage function\par (velocity= 0.45 m/s).\par Left Ventricle: Severe global left ventricular systolic\par dysfunction. Left ventricular enlargement.\par Right Heart: Normal right atrium. Recreased right\par ventricular systolic function. Normal tricuspid valve. Mild\par tricuspid regurgitation. Normal pulmonic valve.\par Pericardium/Pleura: Normal pericardium with nopericardial\par effusion.\par ------------------------------------------------------------------------\par Conclusions:\par 1. No left atrial or left atrial appendage thrombus.\par Normal left atrial appendage function (velocity= 0.45 m/s).\par 2. Left ventricular enlargement.\par 3. Severe global left ventricular systolic dysfunction.\par 4. Recreased right ventricular systolic function.\par GIANFRANCO performed in the setting of atrial flutter with rapid\par ventricular response ( bpm).\par *** No previous Echo exam.\par \par < end of copied text >\par \par \par \par \par Assessment and Plan:\par - Assessment \par 65 yo M with hx of HLD presented for DCCV for aflutter. Aflutter was thought to\par be 2/2 recent bronchitis vs albuterol use.\par \par #Aflutter\par - now rate controlled, pt was never symptomatic when he was in RVR. Pt is s/p\par GIANFRANCO & AFL ablation with Dr. Nielsen. Now in SR ~60-70s.\par - Eliquis to be restarted tonight. Bleeding precautions reviewed with patient.\par - BP soft ~/50-60s. Will start Metoprolol 25mg tomorrow AM\par - Post-procedure instructions reviewed with patient, written instructions\par provided.\par - Will schedule 4-6 week f/u with Dr. Nielsen.\par Discussed with attending and primary team.\par \par \par

## 2022-05-05 NOTE — HISTORY OF PRESENT ILLNESS
[FreeTextEntry1] : He reports feeling fine.  He denies any lightheadedness, dizziness or syncope.\par Taking eliquis inconsistently.\par Daily metoprolol is more consistent.\par Cardiac monitor done in February 3 months after his ablation showed no episodes of atrial fibrillation.  Brief SVTs were noted.  He clarified that he had no episodes of palpitations while wearing the 14 day monitor. (kids touched it)\par \par \par Prior:\par He has been feeling fine.\par Hasn't received the extended cardiac monitor.\par No palpitations or dizziness.\par No bleeding issues.\par EP note reviewed.\par \par Prior:\par He underwent c/v\par left lateral chest/back discomfort since sunday. Some vague twinge. Tender to touch.\par No orthopnea or PND.\par His dyspnea and overal feelings have not changed.\par \par d/c summary reviewed:\par \par Assessment and Plan:\par - Assessment \par 67 yo M with hx of HLD presented for DCCV for aflutter. Aflutter was thought to\par be 2/2 recent bronchitis vs albuterol use.\par \par #Aflutter\par - now rate controlled, pt was never symptomatic when he was in RVR. Pt is s/p\par GIANFRANCO & AFL ablation with Dr. Nielsen. Now in SR ~60-70s.\par - Eliquis to be restarted tonight. Bleeding precautions reviewed with patient.\par - BP soft ~/50-60s. Will start Metoprolol 25mg tomorrow AM\par - Post-procedure instructions reviewed with patient, written instructions\par provided.\par - Will schedule 4-6 week f/u with Dr. Nielsen.\par Discussed with attending and primary team.\par \par \par \par Prior:\par He is taking his medications as directed.\par Feels okay. No chest pain.\par No dizziness.\par Primatine mist use last week was noted.\par \par Prior:\par Dear Evan\par Thank you for referring him for cardiovascular evaluation and management. He is a 66-year-old with a history of hyperlipidemia who presented your office with 2 weeks of wheezing. He was noted to be in a tachyarrhythmia and referred to my office.\par He describes wheezing over the past few weeks. He was treating these wheezing episodes with an albuterol inhaler fairly frequently but did not seek medical attention until today. He denies any lightheadedness, dizziness or syncope or history of arrhythmia.\par He has no chest pains or shortness of breath with exertion has been able to go about his usual activities without difficulty.\par He has no history of coronary artery disease, diabetes mellitus, stroke, renal insufficiency, smoking but does have a family history of coronary disease as his father had bypass in his 60s.\par

## 2022-05-05 NOTE — PHYSICAL EXAM
[Well Developed] : well developed [Well Nourished] : well nourished [No Acute Distress] : no acute distress [Normal Conjunctiva] : normal conjunctiva [Normal Venous Pressure] : normal venous pressure [No Carotid Bruit] : no carotid bruit [Normal S1, S2] : normal S1, S2 [No Murmur] : no murmur [No Rub] : no rub [No Gallop] : no gallop [Soft] : abdomen soft [Non Tender] : non-tender [No Masses/organomegaly] : no masses/organomegaly [Normal Bowel Sounds] : normal bowel sounds [Normal Gait] : normal gait [No Edema] : no edema [No Cyanosis] : no cyanosis [No Clubbing] : no clubbing [No Varicosities] : no varicosities [No Rash] : no rash [No Skin Lesions] : no skin lesions [Moves all extremities] : moves all extremities [No Focal Deficits] : no focal deficits [Normal Speech] : normal speech [Alert and Oriented] : alert and oriented [Normal memory] : normal memory [de-identified] : Tachycardic [de-identified] : Decreased breath sounds throughout with faint expiratory wheeze left greater than right.

## 2022-05-24 ENCOUNTER — APPOINTMENT (OUTPATIENT)
Dept: CARDIOLOGY | Facility: CLINIC | Age: 67
End: 2022-05-24

## 2022-06-08 NOTE — ASU DISCHARGE PLAN (ADULT/PEDIATRIC) - DO NOT SUBMERGE DURATION DAY(S)
Patient wonders if Doctor would recommend a Doctor closer to his home in [place]. Patient states that without him being able to drive it is too difficult for him to make appointments in [facility]. Please call patient  Ok to leave detailed message. 5-7 days

## 2022-06-22 ENCOUNTER — APPOINTMENT (OUTPATIENT)
Dept: CARDIOLOGY | Facility: CLINIC | Age: 67
End: 2022-06-22

## 2022-06-22 PROCEDURE — 93306 TTE W/DOPPLER COMPLETE: CPT

## 2022-09-29 ENCOUNTER — NON-APPOINTMENT (OUTPATIENT)
Age: 67
End: 2022-09-29

## 2022-11-03 ENCOUNTER — APPOINTMENT (OUTPATIENT)
Dept: CARDIOLOGY | Facility: CLINIC | Age: 67
End: 2022-11-03

## 2022-11-28 ENCOUNTER — APPOINTMENT (OUTPATIENT)
Dept: CARDIOLOGY | Facility: CLINIC | Age: 67
End: 2022-11-28

## 2022-11-28 ENCOUNTER — NON-APPOINTMENT (OUTPATIENT)
Age: 67
End: 2022-11-28

## 2022-11-28 VITALS
WEIGHT: 160 LBS | SYSTOLIC BLOOD PRESSURE: 110 MMHG | OXYGEN SATURATION: 99 % | DIASTOLIC BLOOD PRESSURE: 90 MMHG | HEART RATE: 63 BPM | BODY MASS INDEX: 24.33 KG/M2

## 2022-11-28 DIAGNOSIS — R06.02 SHORTNESS OF BREATH: ICD-10-CM

## 2022-11-28 DIAGNOSIS — R73.03 PREDIABETES.: ICD-10-CM

## 2022-11-28 DIAGNOSIS — E78.00 PURE HYPERCHOLESTEROLEMIA, UNSPECIFIED: ICD-10-CM

## 2022-11-28 PROCEDURE — 93000 ELECTROCARDIOGRAM COMPLETE: CPT

## 2022-11-28 PROCEDURE — 99214 OFFICE O/P EST MOD 30 MIN: CPT

## 2022-11-28 RX ORDER — APIXABAN 5 MG/1
5 TABLET, FILM COATED ORAL
Qty: 60 | Refills: 7 | Status: DISCONTINUED | COMMUNITY
Start: 2021-11-22 | End: 2022-11-28

## 2022-11-30 ENCOUNTER — APPOINTMENT (OUTPATIENT)
Dept: CARDIOLOGY | Facility: CLINIC | Age: 67
End: 2022-11-30

## 2022-12-01 ENCOUNTER — NON-APPOINTMENT (OUTPATIENT)
Age: 67
End: 2022-12-01

## 2022-12-01 ENCOUNTER — APPOINTMENT (OUTPATIENT)
Dept: CARDIOLOGY | Facility: CLINIC | Age: 67
End: 2022-12-01

## 2022-12-01 VITALS
BODY MASS INDEX: 24.33 KG/M2 | DIASTOLIC BLOOD PRESSURE: 68 MMHG | SYSTOLIC BLOOD PRESSURE: 124 MMHG | OXYGEN SATURATION: 97 % | WEIGHT: 160 LBS | HEART RATE: 71 BPM

## 2022-12-01 PROCEDURE — 99214 OFFICE O/P EST MOD 30 MIN: CPT

## 2022-12-01 PROCEDURE — 93000 ELECTROCARDIOGRAM COMPLETE: CPT

## 2022-12-01 RX ORDER — METHYLPREDNISOLONE 4 MG/1
4 TABLET ORAL
Refills: 0 | Status: ACTIVE | COMMUNITY

## 2022-12-01 NOTE — DISCUSSION/SUMMARY
[FreeTextEntry1] : He is a 67-year-old with hypercholesterolemia with minimally symptomatic atrial flutter now s/p ablation in November 2021. \par He continues to be in sinus rhythm.  ECG shows sinus bradycardia with a right bundle branch block.  This is unchanged from his prior.\par On examination today his lungs have decreased breath sounds throughout with scant wheezing at the bases.\par This is most consistent with acute asthma exacerbation.\par I have given her prescription for Medrol Dosepak and instructed him to restart his Breo Ellipta once daily.\par I reassured him that this will not cause tachycardia issues.\par We will follow-up the cardiac monitor that was placed earlier this week. [EKG obtained to assist in diagnosis and management of assessed problem(s)] : EKG obtained to assist in diagnosis and management of assessed problem(s)

## 2022-12-01 NOTE — HISTORY OF PRESENT ILLNESS
[FreeTextEntry1] : He returns today for evaluation after having episodes of exertional shortness of breath with walking up 1 flight of stairs carrying 25 pounds which he says he can usually do without much difficulty.  He notes that there may be some wheezing and it feels somewhat like his previous asthma attacks.\par Note chest pains or palpitations at rest, though towards the end of the day he does feel more short of breath.\par \par Prior:\par Since Sunday he has been feeling unwell. He felt lightheaded, dizzy and shortness of breath. Some wheezing too.\par He noticed that his heart rate was 120-130 per his new smart watch. It lasted all day. \par He feels OK now as long as he is doesn't push himself too hard. \par \par Prior:\par He reports feeling fine.  He denies any lightheadedness, dizziness or syncope.\par Taking eliquis inconsistently.\par Daily metoprolol is more consistent.\par Cardiac monitor done in February 3 months after his ablation showed no episodes of atrial fibrillation.  Brief SVTs were noted.  He clarified that he had no episodes of palpitations while wearing the 14 day monitor. (kids touched it)\par \par Prior:\par He has been feeling fine.\par Hasn't received the extended cardiac monitor.\par No palpitations or dizziness.\par No bleeding issues.\par EP note reviewed.\par \par Prior:\par He underwent c/v\par left lateral chest/back discomfort since sunday. Some vague twinge. Tender to touch.\par No orthopnea or PND.\par His dyspnea and overal feelings have not changed.\par \par d/c summary reviewed:\par \par Assessment and Plan:\par - Assessment \par 67 yo M with hx of HLD presented for DCCV for aflutter. Aflutter was thought to\par be 2/2 recent bronchitis vs albuterol use.\par \par #Aflutter\par - now rate controlled, pt was never symptomatic when he was in RVR. Pt is s/p\par GIANFRANCO & AFL ablation with Dr. Nielsen. Now in SR ~60-70s.\par - Eliquis to be restarted tonight. Bleeding precautions reviewed with patient.\par - BP soft ~/50-60s. Will start Metoprolol 25mg tomorrow AM\par - Post-procedure instructions reviewed with patient, written instructions\par provided.\par - Will schedule 4-6 week f/u with Dr. Nielsen.\par Discussed with attending and primary team.\par \par \par \par Prior:\par He is taking his medications as directed.\par Feels okay. No chest pain.\par No dizziness.\par Primatine mist use last week was noted.\par \par Prior:\par Dear Evan,\par Thank you for referring him for cardiovascular evaluation and management. He is a 66-year-old with a history of hyperlipidemia who presented your office with 2 weeks of wheezing. He was noted to be in a tachyarrhythmia and referred to my office.\par He describes wheezing over the past few weeks. He was treating these wheezing episodes with an albuterol inhaler fairly frequently but did not seek medical attention until today. He denies any lightheadedness, dizziness or syncope or history of arrhythmia.\par He has no chest pains or shortness of breath with exertion has been able to go about his usual activities without difficulty.\par He has no history of coronary artery disease, diabetes mellitus, stroke, renal insufficiency, smoking but does have a family history of coronary disease as his father had bypass in his 60s.\par

## 2022-12-01 NOTE — CARDIOLOGY SUMMARY
[de-identified] : PREVIOUS DIAGNOSTIC TESTING:\par [ ] Echocardiogram: < from: Transesophageal Echocardiogram w/o TTE (11.30.21 @\par 14:35) >\par Dimensions: Normal Values:\par LA: 2.0 - 4.0 cm\par Ao: 2.0 - 3.8 cm\par SEPTUM: 0.6 - 1.2 cm\par PWT: 0.6 - 1.1 cm\par LVIDd: 3.0 - 5.6 cm\par LVIDs: 1.8 - 4.0 cm\par EF (Visual Estimate): 30 %\par ------------------------------------------------------------------------\par Observations:\par Mitral Valve: Normal mitral valve. Mild mitral\par regurgitation.\par Aortic Valve/Aorta: Normal trileaflet aortic valve.\par LVOT diameter: 2.4 cm.\par Left Atrium: No left atrial or left atrial appendage\par thrombus. Normal left atrial appendage function\par (velocity= 0.45 m/s).\par Left Ventricle: Severe global left ventricular systolic\par dysfunction. Left ventricular enlargement.\par Right Heart: Normal right atrium. Recreased right\par ventricular systolic function. Normal tricuspid valve. Mild\par tricuspid regurgitation. Normal pulmonic valve.\par Pericardium/Pleura: Normal pericardium with nopericardial\par effusion.\par ------------------------------------------------------------------------\par Conclusions:\par 1. No left atrial or left atrial appendage thrombus.\par Normal left atrial appendage function (velocity= 0.45 m/s).\par 2. Left ventricular enlargement.\par 3. Severe global left ventricular systolic dysfunction.\par 4. Recreased right ventricular systolic function.\par GIANFRANCO performed in the setting of atrial flutter with rapid\par ventricular response ( bpm).\par *** No previous Echo exam.\par \par < end of copied text >\par \par \par \par \par Assessment and Plan:\par - Assessment \par 67 yo M with hx of HLD presented for DCCV for aflutter. Aflutter was thought to\par be 2/2 recent bronchitis vs albuterol use.\par \par #Aflutter\par - now rate controlled, pt was never symptomatic when he was in RVR. Pt is s/p\par GIANFRANCO & AFL ablation with Dr. Nielsen. Now in SR ~60-70s.\par - Eliquis to be restarted tonight. Bleeding precautions reviewed with patient.\par - BP soft ~/50-60s. Will start Metoprolol 25mg tomorrow AM\par - Post-procedure instructions reviewed with patient, written instructions\par provided.\par - Will schedule 4-6 week f/u with Dr. Nielsen.\par Discussed with attending and primary team.\par \par \par

## 2022-12-01 NOTE — PHYSICAL EXAM
[Well Developed] : well developed [Well Nourished] : well nourished [No Acute Distress] : no acute distress [Normal Conjunctiva] : normal conjunctiva [Normal Venous Pressure] : normal venous pressure [No Carotid Bruit] : no carotid bruit [Normal S1, S2] : normal S1, S2 [No Murmur] : no murmur [No Rub] : no rub [No Gallop] : no gallop [Soft] : abdomen soft [Non Tender] : non-tender [No Masses/organomegaly] : no masses/organomegaly [Normal Bowel Sounds] : normal bowel sounds [Normal Gait] : normal gait [No Edema] : no edema [No Cyanosis] : no cyanosis [No Clubbing] : no clubbing [No Varicosities] : no varicosities [No Rash] : no rash [No Skin Lesions] : no skin lesions [Moves all extremities] : moves all extremities [No Focal Deficits] : no focal deficits [Normal Speech] : normal speech [Alert and Oriented] : alert and oriented [Normal memory] : normal memory [de-identified] : Tachycardic [de-identified] : Decreased breath sounds throughout with faint expiratory wheeze left greater than right.

## 2022-12-04 NOTE — CARDIOLOGY SUMMARY
[de-identified] : PREVIOUS DIAGNOSTIC TESTING:\par [ ] Echocardiogram: < from: Transesophageal Echocardiogram w/o TTE (11.30.21 @\par 14:35) >\par Dimensions: Normal Values:\par LA: 2.0 - 4.0 cm\par Ao: 2.0 - 3.8 cm\par SEPTUM: 0.6 - 1.2 cm\par PWT: 0.6 - 1.1 cm\par LVIDd: 3.0 - 5.6 cm\par LVIDs: 1.8 - 4.0 cm\par EF (Visual Estimate): 30 %\par ------------------------------------------------------------------------\par Observations:\par Mitral Valve: Normal mitral valve. Mild mitral\par regurgitation.\par Aortic Valve/Aorta: Normal trileaflet aortic valve.\par LVOT diameter: 2.4 cm.\par Left Atrium: No left atrial or left atrial appendage\par thrombus. Normal left atrial appendage function\par (velocity= 0.45 m/s).\par Left Ventricle: Severe global left ventricular systolic\par dysfunction. Left ventricular enlargement.\par Right Heart: Normal right atrium. Recreased right\par ventricular systolic function. Normal tricuspid valve. Mild\par tricuspid regurgitation. Normal pulmonic valve.\par Pericardium/Pleura: Normal pericardium with nopericardial\par effusion.\par ------------------------------------------------------------------------\par Conclusions:\par 1. No left atrial or left atrial appendage thrombus.\par Normal left atrial appendage function (velocity= 0.45 m/s).\par 2. Left ventricular enlargement.\par 3. Severe global left ventricular systolic dysfunction.\par 4. Recreased right ventricular systolic function.\par GIANFRANCO performed in the setting of atrial flutter with rapid\par ventricular response ( bpm).\par *** No previous Echo exam.\par \par < end of copied text >\par \par \par \par \par Assessment and Plan:\par - Assessment \par 65 yo M with hx of HLD presented for DCCV for aflutter. Aflutter was thought to\par be 2/2 recent bronchitis vs albuterol use.\par \par #Aflutter\par - now rate controlled, pt was never symptomatic when he was in RVR. Pt is s/p\par GIANFRANCO & AFL ablation with Dr. Nielsen. Now in SR ~60-70s.\par - Eliquis to be restarted tonight. Bleeding precautions reviewed with patient.\par - BP soft ~/50-60s. Will start Metoprolol 25mg tomorrow AM\par - Post-procedure instructions reviewed with patient, written instructions\par provided.\par - Will schedule 4-6 week f/u with Dr. Nielsen.\par Discussed with attending and primary team.\par \par \par

## 2022-12-04 NOTE — DISCUSSION/SUMMARY
[FreeTextEntry1] : He is a 67-year-old with hypercholesterolemia with minimally symptomatic atrial flutter now s/p ablation in November 2021. \par He continues to be in sinus rhythm.  ECG shows sinus bradycardia with a right bundle branch block.\par Bradycardia: Asymptomatic and given tendency to have SVT I would consider continuing his metoprolol 25 mg daily.\par Paroxysmal atrial flutter: Status post ablation November 2021.  No sign of atrial fibrillation or flutter on 2-week cardiac monitor.  I agree with EP's evaluation that he does not require any further oral anticoagulation and he may discontinue this medicine as of now.  (Especially since his compliance with this medication is admittedly suboptimal)\par He has been given a 5 day cardiac monitor to assess for any paroxysmal atrial flutter or SVT.\par \par Follow up in 4 weeks.  [EKG obtained to assist in diagnosis and management of assessed problem(s)] : EKG obtained to assist in diagnosis and management of assessed problem(s)

## 2022-12-04 NOTE — HISTORY OF PRESENT ILLNESS
[FreeTextEntry1] : Since Sunday he has been feeling unwell. He felt lightheaded, dizzy and shortness of breath. Some wheezing too.\par He noticed that his heart rate was 120-130 per his new smart watch. It lasted all day. \par He feels OK now as long as he is doesn't push himself too hard. \par \par Prior:\par He reports feeling fine.  He denies any lightheadedness, dizziness or syncope.\par Taking eliquis inconsistently.\par Daily metoprolol is more consistent.\par Cardiac monitor done in February 3 months after his ablation showed no episodes of atrial fibrillation.  Brief SVTs were noted.  He clarified that he had no episodes of palpitations while wearing the 14 day monitor. (kids touched it)\par \par Prior:\par He has been feeling fine.\par Hasn't received the extended cardiac monitor.\par No palpitations or dizziness.\par No bleeding issues.\par EP note reviewed.\par \par Prior:\par He underwent c/v\par left lateral chest/back discomfort since sunday. Some vague twinge. Tender to touch.\par No orthopnea or PND.\par His dyspnea and overal feelings have not changed.\par \par d/c summary reviewed:\par \par Assessment and Plan:\par - Assessment \par 67 yo M with hx of HLD presented for DCCV for aflutter. Aflutter was thought to\par be 2/2 recent bronchitis vs albuterol use.\par \par #Aflutter\par - now rate controlled, pt was never symptomatic when he was in RVR. Pt is s/p\par GIANFRANCO & AFL ablation with Dr. Nielsen. Now in SR ~60-70s.\par - Eliquis to be restarted tonight. Bleeding precautions reviewed with patient.\par - BP soft ~/50-60s. Will start Metoprolol 25mg tomorrow AM\par - Post-procedure instructions reviewed with patient, written instructions\par provided.\par - Will schedule 4-6 week f/u with Dr. Nielsen.\par Discussed with attending and primary team.\par \par \par \par Prior:\par He is taking his medications as directed.\par Feels okay. No chest pain.\par No dizziness.\par Primatine mist use last week was noted.\par \par Prior:\par Dear Evan,\par Thank you for referring him for cardiovascular evaluation and management. He is a 66-year-old with a history of hyperlipidemia who presented your office with 2 weeks of wheezing. He was noted to be in a tachyarrhythmia and referred to my office.\par He describes wheezing over the past few weeks. He was treating these wheezing episodes with an albuterol inhaler fairly frequently but did not seek medical attention until today. He denies any lightheadedness, dizziness or syncope or history of arrhythmia.\par He has no chest pains or shortness of breath with exertion has been able to go about his usual activities without difficulty.\par He has no history of coronary artery disease, diabetes mellitus, stroke, renal insufficiency, smoking but does have a family history of coronary disease as his father had bypass in his 60s.\par

## 2022-12-04 NOTE — PHYSICAL EXAM
[Well Developed] : well developed [Well Nourished] : well nourished [No Acute Distress] : no acute distress [Normal Conjunctiva] : normal conjunctiva [Normal Venous Pressure] : normal venous pressure [No Carotid Bruit] : no carotid bruit [Normal S1, S2] : normal S1, S2 [No Murmur] : no murmur [No Rub] : no rub [No Gallop] : no gallop [Soft] : abdomen soft [Non Tender] : non-tender [No Masses/organomegaly] : no masses/organomegaly [Normal Bowel Sounds] : normal bowel sounds [Normal Gait] : normal gait [No Edema] : no edema [No Cyanosis] : no cyanosis [No Clubbing] : no clubbing [No Varicosities] : no varicosities [No Rash] : no rash [No Skin Lesions] : no skin lesions [Moves all extremities] : moves all extremities [No Focal Deficits] : no focal deficits [Normal Speech] : normal speech [Alert and Oriented] : alert and oriented [Normal memory] : normal memory [de-identified] : Decreased breath sounds throughout with faint expiratory wheeze left greater than right.

## 2023-03-19 ENCOUNTER — RX RENEWAL (OUTPATIENT)
Age: 68
End: 2023-03-19

## 2023-05-30 ENCOUNTER — LABORATORY RESULT (OUTPATIENT)
Age: 68
End: 2023-05-30

## 2023-05-30 ENCOUNTER — APPOINTMENT (OUTPATIENT)
Dept: INTERNAL MEDICINE | Facility: CLINIC | Age: 68
End: 2023-05-30
Payer: MEDICARE

## 2023-05-30 ENCOUNTER — NON-APPOINTMENT (OUTPATIENT)
Age: 68
End: 2023-05-30

## 2023-05-30 VITALS — BODY MASS INDEX: 23.26 KG/M2 | WEIGHT: 153 LBS | DIASTOLIC BLOOD PRESSURE: 60 MMHG | SYSTOLIC BLOOD PRESSURE: 90 MMHG

## 2023-05-30 DIAGNOSIS — Z00.00 ENCOUNTER FOR GENERAL ADULT MEDICAL EXAMINATION W/OUT ABNORMAL FINDINGS: ICD-10-CM

## 2023-05-30 DIAGNOSIS — R63.4 ABNORMAL WEIGHT LOSS: ICD-10-CM

## 2023-05-30 PROCEDURE — 99214 OFFICE O/P EST MOD 30 MIN: CPT | Mod: 25

## 2023-05-30 PROCEDURE — G0439: CPT

## 2023-05-30 PROCEDURE — 93000 ELECTROCARDIOGRAM COMPLETE: CPT | Mod: 59

## 2023-05-30 PROCEDURE — 36415 COLL VENOUS BLD VENIPUNCTURE: CPT

## 2023-05-30 NOTE — ASSESSMENT
[FreeTextEntry1] : This is a 68-year-old male whose history has been reviewed above\par \par He has a stricture of the esophagus he is not complaining of reflux.  He is up-to-date with colonoscopy\par \par He has had episodes of asthma treated with Medrol he does have an inhaler which she does not use but has not been symptomatic.  I did suggest a follow-up with a pulmonologist\par \par He does follow-up with cardiology as he did had a physical fib and flutter currently he is in sinus rhythm\par \par In addition he does have BPH and is taking finasteride\par \par He has had weight loss but this has been over 2-3 years he has had a colonoscopy will inquire about his having an endoscopy\par \par He did have a cardiomyopathy which was probably rhythm induced his last echocardiogram which I reviewed was November 2022 which was normal\par \par He is now complaining of cramps in his legs I explained that this is most likely due to Eugenia statin to try co-Q10 vitamin D magnesium\par \par In terms of his sniffles I asked him to use Flonase when these episodes start however we will do a COVID test.\par Also I am a bit concerned about his weight loss although his social situation does explain it but he was a smoker 20 years ago has a small cough so I think we should get a CAT scan\par \par \par \par

## 2023-05-30 NOTE — HEALTH RISK ASSESSMENT
[Good] : ~his/her~  mood as  good [Yes] : Yes [No falls in past year] : Patient reported no falls in the past year [None] : None [Former] : Former [15-19] : 15-19 [< 15 Years] : < 15 Years [0] : 2) Feeling down, depressed, or hopeless: Not at all (0) [PHQ-2 Negative - No further assessment needed] : PHQ-2 Negative - No further assessment needed [Change in mental status noted] : No change in mental status noted [With Family] : lives with family [College] : College [] :  [Sexually Active] : not sexually active [Feels Safe at Home] : Feels safe at home [Fully functional (bathing, dressing, toileting, transferring, walking, feeding)] : Fully functional (bathing, dressing, toileting, transferring, walking, feeding) [Fully functional (using the telephone, shopping, preparing meals, housekeeping, doing laundry, using] : Fully functional and needs no help or supervision to perform IADLs (using the telephone, shopping, preparing meals, housekeeping, doing laundry, using transportation, managing medications and managing finances) [Reports changes in hearing] : Reports no changes in hearing [Reports changes in vision] : Reports no changes in vision [Reports changes in dental health] : Reports no changes in dental health [Smoke Detector] : smoke detector [Carbon Monoxide Detector] : no carbon monoxide detector [Safety elements used in home] : no safety elements used in home [Seat Belt] :  uses seat belt [Sunscreen] : uses sunscreen [Travel to Developing Areas] : does not  travel to developing areas [TB Exposure] : is not being exposed to tuberculosis [Caregiver Concerns] : does not have caregiver concerns [de-identified] :  but in the same house [FreeTextEntry3] :  but in the same house [I will adhere to the patient's wishes.] : I will adhere to the patient's wishes. [de-identified] : 2003

## 2023-05-30 NOTE — HISTORY OF PRESENT ILLNESS
[FreeTextEntry1] : This is a 65-year-old male for annual health assessment\par \par Specifically we will address his history of anxiety.  He has had weight loss over the last several years he does have a history of colonic polyps and BPH.  He has had atrial fibs of atrial flutter and has had a ablation.  He does have intermittent asthma [de-identified] : Currently he feels well but gets intermittent episodes of nasal stuffiness.  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

## 2023-05-31 LAB
25(OH)D3 SERPL-MCNC: 29.4 NG/ML
ALBUMIN SERPL ELPH-MCNC: 4.5 G/DL
ALP BLD-CCNC: 70 U/L
ALT SERPL-CCNC: 25 U/L
ANION GAP SERPL CALC-SCNC: 11 MMOL/L
APPEARANCE: CLEAR
AST SERPL-CCNC: 22 U/L
BILIRUB SERPL-MCNC: 0.7 MG/DL
BILIRUBIN URINE: NEGATIVE
BLOOD URINE: NEGATIVE
BUN SERPL-MCNC: 18 MG/DL
CALCIUM SERPL-MCNC: 9.8 MG/DL
CHLORIDE SERPL-SCNC: 102 MMOL/L
CHOLEST SERPL-MCNC: 209 MG/DL
CO2 SERPL-SCNC: 28 MMOL/L
COLOR: YELLOW
CREAT SERPL-MCNC: 0.96 MG/DL
EGFR: 85 ML/MIN/1.73M2
ESTIMATED AVERAGE GLUCOSE: 123 MG/DL
GLUCOSE QUALITATIVE U: NEGATIVE MG/DL
GLUCOSE SERPL-MCNC: 92 MG/DL
HBA1C MFR BLD HPLC: 5.9 %
HDLC SERPL-MCNC: 96 MG/DL
KETONES URINE: NEGATIVE MG/DL
LDLC SERPL CALC-MCNC: 99 MG/DL
LEUKOCYTE ESTERASE URINE: NEGATIVE
NITRITE URINE: NEGATIVE
NONHDLC SERPL-MCNC: 113 MG/DL
PH URINE: 5
POTASSIUM SERPL-SCNC: 4.3 MMOL/L
PROT SERPL-MCNC: 6.3 G/DL
PROTEIN URINE: NEGATIVE MG/DL
PSA SERPL-MCNC: 1.07 NG/ML
RAPID RVP RESULT: DETECTED
RV+EV RNA SPEC QL NAA+PROBE: DETECTED
SARS-COV-2 RNA PNL RESP NAA+PROBE: NOT DETECTED
SODIUM SERPL-SCNC: 141 MMOL/L
SPECIFIC GRAVITY URINE: 1.02
T3RU NFR SERPL: 1.1 TBI
T4 SERPL-MCNC: 6.1 UG/DL
TRIGL SERPL-MCNC: 71 MG/DL
TSH SERPL-ACNC: 2.09 UIU/ML
URATE SERPL-MCNC: 4.9 MG/DL
UROBILINOGEN URINE: 0.2 MG/DL

## 2023-09-05 ENCOUNTER — RX RENEWAL (OUTPATIENT)
Age: 68
End: 2023-09-05

## 2023-09-06 ENCOUNTER — NON-APPOINTMENT (OUTPATIENT)
Age: 68
End: 2023-09-06

## 2023-09-08 ENCOUNTER — APPOINTMENT (OUTPATIENT)
Dept: INTERNAL MEDICINE | Facility: CLINIC | Age: 68
End: 2023-09-08

## 2023-11-09 ENCOUNTER — NON-APPOINTMENT (OUTPATIENT)
Age: 68
End: 2023-11-09

## 2023-11-09 ENCOUNTER — APPOINTMENT (OUTPATIENT)
Dept: CARDIOLOGY | Facility: CLINIC | Age: 68
End: 2023-11-09
Payer: MEDICARE

## 2023-11-09 VITALS
SYSTOLIC BLOOD PRESSURE: 100 MMHG | DIASTOLIC BLOOD PRESSURE: 58 MMHG | OXYGEN SATURATION: 100 % | BODY MASS INDEX: 23.57 KG/M2 | WEIGHT: 155 LBS | HEART RATE: 48 BPM

## 2023-11-09 DIAGNOSIS — I48.92 UNSPECIFIED ATRIAL FLUTTER: ICD-10-CM

## 2023-11-09 DIAGNOSIS — I45.10 UNSPECIFIED RIGHT BUNDLE-BRANCH BLOCK: ICD-10-CM

## 2023-11-09 PROCEDURE — 93000 ELECTROCARDIOGRAM COMPLETE: CPT

## 2023-11-09 PROCEDURE — 99214 OFFICE O/P EST MOD 30 MIN: CPT

## 2023-11-10 DIAGNOSIS — R05.9 COUGH, UNSPECIFIED: ICD-10-CM

## 2023-11-11 ENCOUNTER — OUTPATIENT (OUTPATIENT)
Dept: OUTPATIENT SERVICES | Facility: HOSPITAL | Age: 68
LOS: 1 days | End: 2023-11-11
Payer: MEDICARE

## 2023-11-11 ENCOUNTER — APPOINTMENT (OUTPATIENT)
Dept: RADIOLOGY | Facility: CLINIC | Age: 68
End: 2023-11-11
Payer: MEDICARE

## 2023-11-11 DIAGNOSIS — R05.9 COUGH, UNSPECIFIED: ICD-10-CM

## 2023-11-11 PROCEDURE — 71046 X-RAY EXAM CHEST 2 VIEWS: CPT

## 2023-11-11 PROCEDURE — 71046 X-RAY EXAM CHEST 2 VIEWS: CPT | Mod: 26

## 2023-11-23 RX ORDER — METOPROLOL SUCCINATE 25 MG/1
25 TABLET, EXTENDED RELEASE ORAL DAILY
Qty: 90 | Refills: 3 | Status: ACTIVE | COMMUNITY
Start: 2023-03-19 | End: 1900-01-01

## 2023-11-30 ENCOUNTER — APPOINTMENT (OUTPATIENT)
Dept: PULMONOLOGY | Facility: CLINIC | Age: 68
End: 2023-11-30
Payer: MEDICARE

## 2023-11-30 VITALS
WEIGHT: 155 LBS | OXYGEN SATURATION: 97 % | BODY MASS INDEX: 23.49 KG/M2 | HEART RATE: 45 BPM | DIASTOLIC BLOOD PRESSURE: 60 MMHG | SYSTOLIC BLOOD PRESSURE: 92 MMHG | HEIGHT: 68 IN

## 2023-11-30 DIAGNOSIS — J45.50 SEVERE PERSISTENT ASTHMA, UNCOMPLICATED: ICD-10-CM

## 2023-11-30 PROCEDURE — 99203 OFFICE O/P NEW LOW 30 MIN: CPT | Mod: 25

## 2023-11-30 PROCEDURE — 94729 DIFFUSING CAPACITY: CPT

## 2023-11-30 PROCEDURE — 94726 PLETHYSMOGRAPHY LUNG VOLUMES: CPT

## 2023-11-30 PROCEDURE — ZZZZZ: CPT

## 2023-11-30 PROCEDURE — 94060 EVALUATION OF WHEEZING: CPT

## 2023-11-30 RX ORDER — METHYLPREDNISOLONE 4 MG/1
4 TABLET ORAL
Qty: 1 | Refills: 0 | Status: DISCONTINUED | COMMUNITY
Start: 2022-12-01 | End: 2023-11-30

## 2023-11-30 RX ORDER — PREDNISONE 10 MG/1
10 TABLET ORAL
Qty: 60 | Refills: 0 | Status: ACTIVE | COMMUNITY
Start: 2023-11-30 | End: 1900-01-01

## 2023-11-30 RX ORDER — ALBUTEROL SULFATE 90 UG/1
108 (90 BASE) INHALANT RESPIRATORY (INHALATION)
Qty: 1 | Refills: 0 | Status: ACTIVE | COMMUNITY
Start: 2023-11-30 | End: 1900-01-01

## 2023-11-30 RX ORDER — METHYLPREDNISOLONE 4 MG/1
4 TABLET ORAL
Qty: 1 | Refills: 0 | Status: DISCONTINUED | COMMUNITY
Start: 2022-12-30 | End: 2023-11-30

## 2023-11-30 RX ORDER — FLUTICASONE FUROATE AND VILANTEROL TRIFENATATE 200; 25 UG/1; UG/1
200-25 POWDER RESPIRATORY (INHALATION) DAILY
Qty: 1 | Refills: 11 | Status: ACTIVE | COMMUNITY
Start: 2021-11-22 | End: 1900-01-01

## 2023-12-14 ENCOUNTER — APPOINTMENT (OUTPATIENT)
Dept: PULMONOLOGY | Facility: CLINIC | Age: 68
End: 2023-12-14
Payer: MEDICARE

## 2023-12-14 DIAGNOSIS — J45.901 UNSPECIFIED ASTHMA WITH (ACUTE) EXACERBATION: ICD-10-CM

## 2023-12-14 PROCEDURE — 94010 BREATHING CAPACITY TEST: CPT

## 2023-12-14 PROCEDURE — ZZZZZ: CPT

## 2023-12-14 PROCEDURE — 99213 OFFICE O/P EST LOW 20 MIN: CPT | Mod: 25

## 2023-12-14 NOTE — PHYSICAL EXAM
[No Acute Distress] : no acute distress [Normal Appearance] : normal appearance [Normal Rate/Rhythm] : normal rate/rhythm [Normal S1, S2] : normal s1, s2 [Normal Gait] : normal gait

## 2023-12-14 NOTE — HISTORY OF PRESENT ILLNESS
[TextBox_4] : 68-year-old male with dyspnea on exertion and significant reversible airways obstruction.  I had started him on prednisone and Breo and asked him to return for repeat spirometry.  He reports that he is breathing much better.

## 2023-12-14 NOTE — DISCUSSION/SUMMARY
[FreeTextEntry1] : Spirometry is much improved.  I suggested he taper down the prednisone over the next several days but maintain himself on Breo.  He will inquire from his pharmacy if there are less expensive co-pays for similar medications.  I asked him to follow-up with me in 3 months for repeat spirometry.  He does have peripheral eosinophilia and his asthma is significant and he may require an interleukin inhibitor.

## 2023-12-27 RX ORDER — BUDESONIDE AND FORMOTEROL FUMARATE DIHYDRATE 160; 4.5 UG/1; UG/1
160-4.5 AEROSOL RESPIRATORY (INHALATION)
Qty: 1 | Refills: 2 | Status: ACTIVE | COMMUNITY
Start: 2023-12-27 | End: 1900-01-01

## 2024-03-05 ENCOUNTER — APPOINTMENT (OUTPATIENT)
Dept: PULMONOLOGY | Facility: CLINIC | Age: 69
End: 2024-03-05
Payer: MEDICARE

## 2024-03-05 VITALS
DIASTOLIC BLOOD PRESSURE: 67 MMHG | BODY MASS INDEX: 24.86 KG/M2 | TEMPERATURE: 97.6 F | HEIGHT: 68 IN | SYSTOLIC BLOOD PRESSURE: 120 MMHG | WEIGHT: 164 LBS | OXYGEN SATURATION: 99 % | HEART RATE: 48 BPM | RESPIRATION RATE: 16 BRPM

## 2024-03-05 DIAGNOSIS — J45.909 UNSPECIFIED ASTHMA, UNCOMPLICATED: ICD-10-CM

## 2024-03-05 PROCEDURE — 99213 OFFICE O/P EST LOW 20 MIN: CPT | Mod: GC

## 2024-03-05 NOTE — PHYSICAL EXAM
[No Acute Distress] : no acute distress [Normal Oropharynx] : normal oropharynx [Normal Appearance] : normal appearance [No Neck Mass] : no neck mass [Normal Rate/Rhythm] : normal rate/rhythm [Normal S1, S2] : normal s1, s2 [No Resp Distress] : no resp distress [Clear to Auscultation Bilaterally] : clear to auscultation bilaterally [No Abnormalities] : no abnormalities [Benign] : benign [No Clubbing] : no clubbing [No Cyanosis] : no cyanosis [No Edema] : no edema [FROM] : FROM [Normal Color/ Pigmentation] : normal color/ pigmentation [No Focal Deficits] : no focal deficits [Oriented x3] : oriented x3 [Normal Affect] : normal affect

## 2024-03-05 NOTE — HISTORY OF PRESENT ILLNESS
[TextBox_4] : 69-year-old male with dyspnea on exertion and significant reversible airways obstruction. He had taken prednisone and Breo and asked him to return for repeat spirometry with improvement in obstructive defect, although still present. He reports that he is breathing much better. Switched to symbicort given reduced cost with similar efficacy. Denies SCHULZ, wheezing, nighttime symtpoms currently.

## 2024-03-05 NOTE — ASSESSMENT
[FreeTextEntry1] : 69-year-old male with dyspnea on exertion and significant reversible airways obstruction.   #asthma Has since tapered off prednisone and switched from breo to generic symbicort given reduced cost. Significant improvement in airways obstruction from PFTs performed 11/30/23 to 12/14/23 - continue symbicort 2 puffs BID - defer pursuing biologic this visit given improvement with ICS+LABA - RTC 3-4 months

## 2024-03-06 ENCOUNTER — RX RENEWAL (OUTPATIENT)
Age: 69
End: 2024-03-06

## 2024-05-09 ENCOUNTER — APPOINTMENT (OUTPATIENT)
Dept: CARDIOLOGY | Facility: CLINIC | Age: 69
End: 2024-05-09

## 2024-06-28 ENCOUNTER — RX RENEWAL (OUTPATIENT)
Age: 69
End: 2024-06-28

## 2024-09-10 ENCOUNTER — RX RENEWAL (OUTPATIENT)
Age: 69
End: 2024-09-10

## 2024-09-18 ENCOUNTER — LABORATORY RESULT (OUTPATIENT)
Age: 69
End: 2024-09-18

## 2024-09-18 ENCOUNTER — APPOINTMENT (OUTPATIENT)
Dept: INTERNAL MEDICINE | Facility: CLINIC | Age: 69
End: 2024-09-18
Payer: MEDICARE

## 2024-09-18 ENCOUNTER — NON-APPOINTMENT (OUTPATIENT)
Age: 69
End: 2024-09-18

## 2024-09-18 VITALS
SYSTOLIC BLOOD PRESSURE: 90 MMHG | HEIGHT: 67.32 IN | WEIGHT: 167 LBS | BODY MASS INDEX: 25.91 KG/M2 | DIASTOLIC BLOOD PRESSURE: 70 MMHG

## 2024-09-18 DIAGNOSIS — E78.00 PURE HYPERCHOLESTEROLEMIA, UNSPECIFIED: ICD-10-CM

## 2024-09-18 DIAGNOSIS — Z00.00 ENCOUNTER FOR GENERAL ADULT MEDICAL EXAMINATION W/OUT ABNORMAL FINDINGS: ICD-10-CM

## 2024-09-18 PROCEDURE — 36415 COLL VENOUS BLD VENIPUNCTURE: CPT

## 2024-09-18 PROCEDURE — 90662 IIV NO PRSV INCREASED AG IM: CPT

## 2024-09-18 PROCEDURE — 99213 OFFICE O/P EST LOW 20 MIN: CPT | Mod: 25

## 2024-09-18 PROCEDURE — G0439: CPT

## 2024-09-18 PROCEDURE — G0008: CPT

## 2024-09-18 PROCEDURE — 93000 ELECTROCARDIOGRAM COMPLETE: CPT

## 2024-09-18 NOTE — HEALTH RISK ASSESSMENT
[Good] : ~his/her~ current health as good [Fair] :  ~his/her~ mood as fair [Yes] : Yes [No] : In the past 12 months have you used drugs other than those required for medical reasons? No [No falls in past year] : Patient reported no falls in the past year [0] : 2) Feeling down, depressed, or hopeless: Not at all (0) [Never] : Never [NO] : No [None] : None [With Family] : lives with family [Employed] : employed [College] : College [] :  [Feels Safe at Home] : Feels safe at home [Fully functional (bathing, dressing, toileting, transferring, walking, feeding)] : Fully functional (bathing, dressing, toileting, transferring, walking, feeding) [Fully functional (using the telephone, shopping, preparing meals, housekeeping, doing laundry, using] : Fully functional and needs no help or supervision to perform IADLs (using the telephone, shopping, preparing meals, housekeeping, doing laundry, using transportation, managing medications and managing finances) [Smoke Detector] : smoke detector [Carbon Monoxide Detector] : carbon monoxide detector [Seat Belt] :  uses seat belt [I will adhere to the patient's wishes.] : I will adhere to the patient's wishes.

## 2024-09-19 LAB
25(OH)D3 SERPL-MCNC: 29.4 NG/ML
ALBUMIN SERPL ELPH-MCNC: 4.4 G/DL
ALP BLD-CCNC: 63 U/L
ALT SERPL-CCNC: 25 U/L
ANION GAP SERPL CALC-SCNC: 13 MMOL/L
APPEARANCE: CLEAR
AST SERPL-CCNC: 28 U/L
BASOPHILS # BLD AUTO: 0.07 K/UL
BASOPHILS NFR BLD AUTO: 0.9 %
BILIRUB SERPL-MCNC: 0.8 MG/DL
BILIRUBIN URINE: NEGATIVE
BLOOD URINE: NEGATIVE
BUN SERPL-MCNC: 24 MG/DL
CALCIUM SERPL-MCNC: 9.5 MG/DL
CHLORIDE SERPL-SCNC: 106 MMOL/L
CHOLEST SERPL-MCNC: 185 MG/DL
CO2 SERPL-SCNC: 24 MMOL/L
COLOR: YELLOW
CREAT SERPL-MCNC: 1.13 MG/DL
EGFR: 69 ML/MIN/1.73M2
EOSINOPHIL # BLD AUTO: 0.24 K/UL
EOSINOPHIL NFR BLD AUTO: 3.2 %
ESTIMATED AVERAGE GLUCOSE: 117 MG/DL
GLUCOSE QUALITATIVE U: NEGATIVE MG/DL
GLUCOSE SERPL-MCNC: 90 MG/DL
HBA1C MFR BLD HPLC: 5.7 %
HCT VFR BLD CALC: 42.5 %
HDLC SERPL-MCNC: 77 MG/DL
HGB BLD-MCNC: 13.7 G/DL
IMM GRANULOCYTES NFR BLD AUTO: 0.1 %
KETONES URINE: NEGATIVE MG/DL
LDLC SERPL CALC-MCNC: 95 MG/DL
LEUKOCYTE ESTERASE URINE: NEGATIVE
LYMPHOCYTES # BLD AUTO: 1.42 K/UL
LYMPHOCYTES NFR BLD AUTO: 19.2 %
MAN DIFF?: NORMAL
MCHC RBC-ENTMCNC: 30.5 PG
MCHC RBC-ENTMCNC: 32.2 GM/DL
MCV RBC AUTO: 94.7 FL
MONOCYTES # BLD AUTO: 0.61 K/UL
MONOCYTES NFR BLD AUTO: 8.2 %
NEUTROPHILS # BLD AUTO: 5.06 K/UL
NEUTROPHILS NFR BLD AUTO: 68.4 %
NITRITE URINE: NEGATIVE
NONHDLC SERPL-MCNC: 109 MG/DL
PH URINE: 5.5
PLATELET # BLD AUTO: 176 K/UL
POTASSIUM SERPL-SCNC: 4.1 MMOL/L
PROT SERPL-MCNC: 6.4 G/DL
PROTEIN URINE: NEGATIVE MG/DL
PSA SERPL-MCNC: 1.42 NG/ML
RBC # BLD: 4.49 M/UL
RBC # FLD: 13.5 %
SODIUM SERPL-SCNC: 143 MMOL/L
SPECIFIC GRAVITY URINE: 1.02
T3RU NFR SERPL: 1 TBI
T4 SERPL-MCNC: 6.1 UG/DL
TRIGL SERPL-MCNC: 77 MG/DL
TSH SERPL-ACNC: 2.18 UIU/ML
URATE SERPL-MCNC: 4.5 MG/DL
UROBILINOGEN URINE: 0.2 MG/DL
WBC # FLD AUTO: 7.41 K/UL

## 2024-09-20 DIAGNOSIS — G62.9 POLYNEUROPATHY, UNSPECIFIED: ICD-10-CM

## 2024-09-20 LAB — VIT B12 SERPL-MCNC: 533 PG/ML

## 2024-09-24 ENCOUNTER — NON-APPOINTMENT (OUTPATIENT)
Age: 69
End: 2024-09-24

## 2024-09-24 ENCOUNTER — APPOINTMENT (OUTPATIENT)
Dept: PULMONOLOGY | Facility: CLINIC | Age: 69
End: 2024-09-24
Payer: MEDICARE

## 2024-09-24 ENCOUNTER — APPOINTMENT (OUTPATIENT)
Dept: CARDIOLOGY | Facility: CLINIC | Age: 69
End: 2024-09-24
Payer: MEDICARE

## 2024-09-24 VITALS
DIASTOLIC BLOOD PRESSURE: 52 MMHG | HEIGHT: 67.32 IN | OXYGEN SATURATION: 97 % | BODY MASS INDEX: 26.06 KG/M2 | WEIGHT: 168 LBS | SYSTOLIC BLOOD PRESSURE: 97 MMHG | HEART RATE: 32 BPM

## 2024-09-24 VITALS
BODY MASS INDEX: 26.06 KG/M2 | SYSTOLIC BLOOD PRESSURE: 100 MMHG | DIASTOLIC BLOOD PRESSURE: 64 MMHG | OXYGEN SATURATION: 97 % | HEART RATE: 74 BPM | WEIGHT: 168 LBS

## 2024-09-24 DIAGNOSIS — J45.909 UNSPECIFIED ASTHMA, UNCOMPLICATED: ICD-10-CM

## 2024-09-24 DIAGNOSIS — R06.02 SHORTNESS OF BREATH: ICD-10-CM

## 2024-09-24 DIAGNOSIS — I48.92 UNSPECIFIED ATRIAL FLUTTER: ICD-10-CM

## 2024-09-24 DIAGNOSIS — I45.10 UNSPECIFIED RIGHT BUNDLE-BRANCH BLOCK: ICD-10-CM

## 2024-09-24 DIAGNOSIS — R00.1 BRADYCARDIA, UNSPECIFIED: ICD-10-CM

## 2024-09-24 PROCEDURE — 93000 ELECTROCARDIOGRAM COMPLETE: CPT

## 2024-09-24 PROCEDURE — 99215 OFFICE O/P EST HI 40 MIN: CPT

## 2024-09-24 PROCEDURE — G2211 COMPLEX E/M VISIT ADD ON: CPT

## 2024-09-24 PROCEDURE — 99214 OFFICE O/P EST MOD 30 MIN: CPT | Mod: GC

## 2024-09-24 NOTE — ASSESSMENT
[FreeTextEntry1] : 69-year-old male with dyspnea on exertion and significant reversible airways obstruction.  #asthma Has since tapered off prednisone  - on Breo, will continue  - defer pursuing biologic this visit given improvement with ICS+LABA - RTC 3-4 months.  ON visit he was noted to have bradycardia EKG obtained which showed RBBB and possible junctional rhythm Discussed with Dr. LIsker - made appointment for this afternoon at 430pm Patient aware of plan and appointment.

## 2024-09-24 NOTE — PHYSICAL EXAM
[No Acute Distress] : no acute distress [Normal Oropharynx] : normal oropharynx [Normal Appearance] : normal appearance [No Neck Mass] : no neck mass [No Resp Distress] : no resp distress [Clear to Auscultation Bilaterally] : clear to auscultation bilaterally [No Abnormalities] : no abnormalities [Benign] : benign [Normal Gait] : normal gait [No Clubbing] : no clubbing [No Cyanosis] : no cyanosis [No Edema] : no edema [FROM] : FROM [Normal Color/ Pigmentation] : normal color/ pigmentation [TextBox_54] : bradycardic, regularly irregular

## 2024-09-24 NOTE — HISTORY OF PRESENT ILLNESS
[Never] : never [TextBox_4] : Hx of asthma on budesonide/formoterol Has some dyspnea on exertion which he attributes to changing of the season otherwise no chest pain, feels at baseline Feeling fine since stopping asthma medications

## 2024-09-25 LAB
ALBUMIN MFR SERPL ELPH: 66 %
ALBUMIN SERPL-MCNC: 4.3 G/DL
ALBUMIN/GLOB SERPL: 2 RATIO
ALPHA1 GLOB MFR SERPL ELPH: 4.3 %
ALPHA1 GLOB SERPL ELPH-MCNC: 0.3 G/DL
ALPHA2 GLOB MFR SERPL ELPH: 10.2 %
ALPHA2 GLOB SERPL ELPH-MCNC: 0.7 G/DL
B-GLOBULIN MFR SERPL ELPH: 10.4 %
B-GLOBULIN SERPL ELPH-MCNC: 0.7 G/DL
GAMMA GLOB FLD ELPH-MCNC: 0.6 G/DL
GAMMA GLOB MFR SERPL ELPH: 9.1 %
INTERPRETATION SERPL IEP-IMP: NORMAL
PROT SERPL-MCNC: 6.5 G/DL
PROT SERPL-MCNC: 6.5 G/DL

## 2024-10-02 ENCOUNTER — APPOINTMENT (OUTPATIENT)
Dept: CARDIOLOGY | Facility: CLINIC | Age: 69
End: 2024-10-02
Payer: MEDICARE

## 2024-10-02 PROCEDURE — 93306 TTE W/DOPPLER COMPLETE: CPT

## 2024-10-07 ENCOUNTER — RX RENEWAL (OUTPATIENT)
Age: 69
End: 2024-10-07

## 2024-12-13 ENCOUNTER — APPOINTMENT (OUTPATIENT)
Dept: GASTROENTEROLOGY | Facility: CLINIC | Age: 69
End: 2024-12-13
Payer: MEDICARE

## 2024-12-13 VITALS
HEIGHT: 69 IN | RESPIRATION RATE: 16 BRPM | HEART RATE: 60 BPM | SYSTOLIC BLOOD PRESSURE: 129 MMHG | WEIGHT: 171 LBS | DIASTOLIC BLOOD PRESSURE: 85 MMHG | TEMPERATURE: 98 F | BODY MASS INDEX: 25.33 KG/M2 | OXYGEN SATURATION: 98 %

## 2024-12-13 DIAGNOSIS — I48.92 UNSPECIFIED ATRIAL FLUTTER: ICD-10-CM

## 2024-12-13 DIAGNOSIS — Z78.9 OTHER SPECIFIED HEALTH STATUS: ICD-10-CM

## 2024-12-13 DIAGNOSIS — Z98.890 OTHER SPECIFIED POSTPROCEDURAL STATES: ICD-10-CM

## 2024-12-13 DIAGNOSIS — Z86.0100 PERSONAL HISTORY OF COLON POLYPS, UNSPECIFIED: ICD-10-CM

## 2024-12-13 DIAGNOSIS — Z12.11 ENCOUNTER FOR SCREENING FOR MALIGNANT NEOPLASM OF COLON: ICD-10-CM

## 2024-12-13 DIAGNOSIS — Z86.79 OTHER SPECIFIED POSTPROCEDURAL STATES: ICD-10-CM

## 2024-12-13 DIAGNOSIS — J45.901 UNSPECIFIED ASTHMA WITH (ACUTE) EXACERBATION: ICD-10-CM

## 2024-12-13 PROCEDURE — 99204 OFFICE O/P NEW MOD 45 MIN: CPT

## 2024-12-13 RX ORDER — SODIUM SULFATE, POTASSIUM SULFATE AND MAGNESIUM SULFATE 1.6; 3.13; 17.5 G/177ML; G/177ML; G/177ML
17.5-3.13-1.6 SOLUTION ORAL
Qty: 2 | Refills: 0 | Status: ACTIVE | COMMUNITY
Start: 2024-12-13 | End: 1900-01-01

## 2025-01-14 ENCOUNTER — RX RENEWAL (OUTPATIENT)
Age: 70
End: 2025-01-14

## 2025-01-22 ENCOUNTER — APPOINTMENT (OUTPATIENT)
Dept: CARDIOLOGY | Facility: CLINIC | Age: 70
End: 2025-01-22

## 2025-01-23 ENCOUNTER — NON-APPOINTMENT (OUTPATIENT)
Age: 70
End: 2025-01-23

## 2025-01-23 ENCOUNTER — APPOINTMENT (OUTPATIENT)
Dept: CARDIOLOGY | Facility: CLINIC | Age: 70
End: 2025-01-23
Payer: MEDICARE

## 2025-01-23 VITALS
DIASTOLIC BLOOD PRESSURE: 80 MMHG | BODY MASS INDEX: 25.84 KG/M2 | WEIGHT: 175 LBS | OXYGEN SATURATION: 98 % | SYSTOLIC BLOOD PRESSURE: 120 MMHG | HEART RATE: 60 BPM

## 2025-01-23 DIAGNOSIS — I45.10 UNSPECIFIED RIGHT BUNDLE-BRANCH BLOCK: ICD-10-CM

## 2025-01-23 DIAGNOSIS — I49.3 VENTRICULAR PREMATURE DEPOLARIZATION: ICD-10-CM

## 2025-01-23 PROCEDURE — 99214 OFFICE O/P EST MOD 30 MIN: CPT

## 2025-01-23 PROCEDURE — G2211 COMPLEX E/M VISIT ADD ON: CPT

## 2025-01-23 PROCEDURE — 93000 ELECTROCARDIOGRAM COMPLETE: CPT

## 2025-02-12 ENCOUNTER — APPOINTMENT (OUTPATIENT)
Dept: GASTROENTEROLOGY | Facility: AMBULATORY MEDICAL SERVICES | Age: 70
End: 2025-02-12
Payer: MEDICARE

## 2025-02-12 PROCEDURE — 45378 DIAGNOSTIC COLONOSCOPY: CPT | Mod: PT

## 2025-02-26 ENCOUNTER — APPOINTMENT (OUTPATIENT)
Dept: ELECTROPHYSIOLOGY | Facility: CLINIC | Age: 70
End: 2025-02-26
Payer: MEDICARE

## 2025-02-26 ENCOUNTER — NON-APPOINTMENT (OUTPATIENT)
Age: 70
End: 2025-02-26

## 2025-02-26 ENCOUNTER — APPOINTMENT (OUTPATIENT)
Dept: ELECTROPHYSIOLOGY | Facility: CLINIC | Age: 70
End: 2025-02-26

## 2025-02-26 VITALS
OXYGEN SATURATION: 94 % | BODY MASS INDEX: 25.92 KG/M2 | RESPIRATION RATE: 14 BRPM | HEART RATE: 61 BPM | SYSTOLIC BLOOD PRESSURE: 120 MMHG | DIASTOLIC BLOOD PRESSURE: 77 MMHG | WEIGHT: 175 LBS | HEIGHT: 69 IN

## 2025-02-26 DIAGNOSIS — I49.1 ATRIAL PREMATURE DEPOLARIZATION: ICD-10-CM

## 2025-02-26 DIAGNOSIS — I48.92 UNSPECIFIED ATRIAL FLUTTER: ICD-10-CM

## 2025-02-26 PROCEDURE — 99204 OFFICE O/P NEW MOD 45 MIN: CPT

## 2025-02-26 PROCEDURE — 93000 ELECTROCARDIOGRAM COMPLETE: CPT

## 2025-03-11 ENCOUNTER — APPOINTMENT (OUTPATIENT)
Dept: INTERNAL MEDICINE | Facility: CLINIC | Age: 70
End: 2025-03-11

## 2025-03-21 PROCEDURE — 93244 EXT ECG>48HR<7D REV&INTERPJ: CPT

## 2025-05-13 ENCOUNTER — RX RENEWAL (OUTPATIENT)
Age: 70
End: 2025-05-13

## 2025-06-03 ENCOUNTER — NON-APPOINTMENT (OUTPATIENT)
Age: 70
End: 2025-06-03

## 2025-07-14 ENCOUNTER — LABORATORY RESULT (OUTPATIENT)
Age: 70
End: 2025-07-14

## 2025-07-14 ENCOUNTER — APPOINTMENT (OUTPATIENT)
Dept: INTERNAL MEDICINE | Facility: CLINIC | Age: 70
End: 2025-07-14
Payer: MEDICARE

## 2025-07-14 VITALS
SYSTOLIC BLOOD PRESSURE: 122 MMHG | BODY MASS INDEX: 25.92 KG/M2 | HEIGHT: 69 IN | DIASTOLIC BLOOD PRESSURE: 70 MMHG | WEIGHT: 175 LBS

## 2025-07-14 PROCEDURE — G2211 COMPLEX E/M VISIT ADD ON: CPT

## 2025-07-14 PROCEDURE — 99214 OFFICE O/P EST MOD 30 MIN: CPT

## 2025-07-14 PROCEDURE — 36415 COLL VENOUS BLD VENIPUNCTURE: CPT

## 2025-07-15 LAB
ALBUMIN SERPL ELPH-MCNC: 4.5 G/DL
ALP BLD-CCNC: 72 U/L
ALT SERPL-CCNC: 22 U/L
ANION GAP SERPL CALC-SCNC: 17 MMOL/L
AST SERPL-CCNC: 23 U/L
BASOPHILS # BLD AUTO: 0.05 K/UL
BASOPHILS NFR BLD AUTO: 0.8 %
BILIRUB SERPL-MCNC: 0.8 MG/DL
BUN SERPL-MCNC: 14 MG/DL
CALCIUM SERPL-MCNC: 10.2 MG/DL
CHLORIDE SERPL-SCNC: 103 MMOL/L
CHOLEST SERPL-MCNC: 204 MG/DL
CO2 SERPL-SCNC: 23 MMOL/L
CREAT SERPL-MCNC: 1.09 MG/DL
EGFRCR SERPLBLD CKD-EPI 2021: 72 ML/MIN/1.73M2
EOSINOPHIL # BLD AUTO: 0.16 K/UL
EOSINOPHIL NFR BLD AUTO: 2.7 %
ESTIMATED AVERAGE GLUCOSE: 111 MG/DL
GLUCOSE SERPL-MCNC: 86 MG/DL
HBA1C MFR BLD HPLC: 5.5 %
HCT VFR BLD CALC: 42.3 %
HDLC SERPL-MCNC: 80 MG/DL
HGB BLD-MCNC: 13.8 G/DL
IMM GRANULOCYTES NFR BLD AUTO: 0.2 %
LDLC SERPL-MCNC: 112 MG/DL
LYMPHOCYTES # BLD AUTO: 1.18 K/UL
LYMPHOCYTES NFR BLD AUTO: 19.6 %
MAN DIFF?: NORMAL
MCHC RBC-ENTMCNC: 30.9 PG
MCHC RBC-ENTMCNC: 32.6 G/DL
MCV RBC AUTO: 94.6 FL
MONOCYTES # BLD AUTO: 0.63 K/UL
MONOCYTES NFR BLD AUTO: 10.5 %
NEUTROPHILS # BLD AUTO: 3.98 K/UL
NEUTROPHILS NFR BLD AUTO: 66.2 %
NONHDLC SERPL-MCNC: 124 MG/DL
PLATELET # BLD AUTO: 171 K/UL
POTASSIUM SERPL-SCNC: 4.2 MMOL/L
PROT SERPL-MCNC: 6.3 G/DL
RBC # BLD: 4.47 M/UL
RBC # FLD: 13.7 %
SODIUM SERPL-SCNC: 143 MMOL/L
T3RU NFR SERPL: 1 TBI
T4 SERPL-MCNC: 6.7 UG/DL
TRIGL SERPL-MCNC: 66 MG/DL
TSH SERPL-ACNC: 2.5 UIU/ML
URATE SERPL-MCNC: 4.8 MG/DL
WBC # FLD AUTO: 6.01 K/UL

## 2025-08-28 ENCOUNTER — RX RENEWAL (OUTPATIENT)
Age: 70
End: 2025-08-28

## 2025-09-16 ENCOUNTER — LABORATORY RESULT (OUTPATIENT)
Age: 70
End: 2025-09-16

## 2025-09-16 ENCOUNTER — APPOINTMENT (OUTPATIENT)
Dept: INTERNAL MEDICINE | Facility: CLINIC | Age: 70
End: 2025-09-16
Payer: MEDICARE

## 2025-09-16 VITALS
DIASTOLIC BLOOD PRESSURE: 72 MMHG | HEIGHT: 68.11 IN | SYSTOLIC BLOOD PRESSURE: 112 MMHG | BODY MASS INDEX: 25.31 KG/M2 | WEIGHT: 167 LBS

## 2025-09-16 DIAGNOSIS — Z00.00 ENCOUNTER FOR GENERAL ADULT MEDICAL EXAMINATION W/OUT ABNORMAL FINDINGS: ICD-10-CM

## 2025-09-16 DIAGNOSIS — E78.00 PURE HYPERCHOLESTEROLEMIA, UNSPECIFIED: ICD-10-CM

## 2025-09-16 PROCEDURE — G0439: CPT

## 2025-09-16 PROCEDURE — 36415 COLL VENOUS BLD VENIPUNCTURE: CPT

## 2025-09-16 PROCEDURE — 90662 IIV NO PRSV INCREASED AG IM: CPT

## 2025-09-16 PROCEDURE — 93000 ELECTROCARDIOGRAM COMPLETE: CPT

## 2025-09-16 PROCEDURE — G0008: CPT

## 2025-09-17 LAB
25(OH)D3 SERPL-MCNC: 29.1 NG/ML
ALBUMIN SERPL ELPH-MCNC: 4.2 G/DL
ALP BLD-CCNC: 81 U/L
ALT SERPL-CCNC: 28 U/L
ANION GAP SERPL CALC-SCNC: 15 MMOL/L
APPEARANCE: CLEAR
AST SERPL-CCNC: 28 U/L
BASOPHILS # BLD AUTO: 0.04 K/UL
BASOPHILS NFR BLD AUTO: 0.6 %
BILIRUB SERPL-MCNC: 0.9 MG/DL
BILIRUBIN URINE: NEGATIVE
BLOOD URINE: NEGATIVE
BUN SERPL-MCNC: 17 MG/DL
CALCIUM SERPL-MCNC: 9.8 MG/DL
CHLORIDE SERPL-SCNC: 107 MMOL/L
CHOLEST SERPL-MCNC: 197 MG/DL
CO2 SERPL-SCNC: 24 MMOL/L
COLOR: YELLOW
CREAT SERPL-MCNC: 1.05 MG/DL
EGFRCR SERPLBLD CKD-EPI 2021: 75 ML/MIN/1.73M2
EOSINOPHIL # BLD AUTO: 0.22 K/UL
EOSINOPHIL NFR BLD AUTO: 3.1 %
ESTIMATED AVERAGE GLUCOSE: 120 MG/DL
GLUCOSE QUALITATIVE U: NEGATIVE MG/DL
GLUCOSE SERPL-MCNC: 89 MG/DL
HBA1C MFR BLD HPLC: 5.8 %
HCT VFR BLD CALC: 42.8 %
HDLC SERPL-MCNC: 77 MG/DL
HGB BLD-MCNC: 13.9 G/DL
IMM GRANULOCYTES NFR BLD AUTO: 0.3 %
KETONES URINE: NEGATIVE MG/DL
LDLC SERPL-MCNC: 107 MG/DL
LEUKOCYTE ESTERASE URINE: ABNORMAL
LYMPHOCYTES # BLD AUTO: 1.19 K/UL
LYMPHOCYTES NFR BLD AUTO: 17 %
MAN DIFF?: NORMAL
MCHC RBC-ENTMCNC: 31.1 PG
MCHC RBC-ENTMCNC: 32.5 G/DL
MCV RBC AUTO: 95.7 FL
MONOCYTES # BLD AUTO: 0.58 K/UL
MONOCYTES NFR BLD AUTO: 8.3 %
NEUTROPHILS # BLD AUTO: 4.94 K/UL
NEUTROPHILS NFR BLD AUTO: 70.7 %
NITRITE URINE: NEGATIVE
NONHDLC SERPL-MCNC: 120 MG/DL
PH URINE: 5.5
PLATELET # BLD AUTO: 190 K/UL
POTASSIUM SERPL-SCNC: 5 MMOL/L
PROT SERPL-MCNC: 6.5 G/DL
PROTEIN URINE: NEGATIVE MG/DL
PSA SERPL-MCNC: 1.25 NG/ML
RBC # BLD: 4.47 M/UL
RBC # FLD: 13.2 %
SODIUM SERPL-SCNC: 146 MMOL/L
SPECIFIC GRAVITY URINE: 1.02
T3RU NFR SERPL: 1 TBI
T4 SERPL-MCNC: 6.7 UG/DL
TRIGL SERPL-MCNC: 73 MG/DL
TSH SERPL-ACNC: 1.8 UIU/ML
URATE SERPL-MCNC: 4.4 MG/DL
UROBILINOGEN URINE: 0.2 MG/DL
WBC # FLD AUTO: 6.99 K/UL

## 2025-09-17 RX ORDER — EZETIMIBE 10 MG/1
10 TABLET ORAL
Qty: 90 | Refills: 3 | Status: ACTIVE | COMMUNITY
Start: 2025-09-17 | End: 1900-01-01